# Patient Record
Sex: FEMALE | Race: BLACK OR AFRICAN AMERICAN | Employment: FULL TIME | URBAN - METROPOLITAN AREA
[De-identification: names, ages, dates, MRNs, and addresses within clinical notes are randomized per-mention and may not be internally consistent; named-entity substitution may affect disease eponyms.]

---

## 2017-01-24 DIAGNOSIS — I10 ESSENTIAL HYPERTENSION WITH GOAL BLOOD PRESSURE LESS THAN 140/90: ICD-10-CM

## 2017-01-24 RX ORDER — LISINOPRIL AND HYDROCHLOROTHIAZIDE 20; 25 MG/1; MG/1
1 TABLET ORAL DAILY
Qty: 30 TAB | Refills: 5 | Status: SHIPPED | OUTPATIENT
Start: 2017-01-24 | End: 2017-02-22 | Stop reason: SDUPTHER

## 2017-01-24 RX ORDER — FUROSEMIDE 40 MG/1
40 TABLET ORAL DAILY
Qty: 30 TAB | Refills: 5 | Status: SHIPPED | OUTPATIENT
Start: 2017-01-24 | End: 2017-02-22 | Stop reason: SDUPTHER

## 2017-01-24 NOTE — TELEPHONE ENCOUNTER
Pt called in requesting refill of her   Requested Prescriptions     Pending Prescriptions Disp Refills    lisinopril-hydroCHLOROthiazide (PRINZIDE, ZESTORETIC) 20-25 mg per tablet 30 Tab 5     Sig: Take 1 Tab by mouth daily.  furosemide (LASIX) 40 mg tablet 30 Tab 5     Sig: Take 1 Tab by mouth daily. .   Please call patient when medication is sent to the pharmacy.

## 2017-02-22 DIAGNOSIS — I10 ESSENTIAL HYPERTENSION WITH GOAL BLOOD PRESSURE LESS THAN 140/90: ICD-10-CM

## 2017-02-22 RX ORDER — FUROSEMIDE 40 MG/1
40 TABLET ORAL DAILY
Qty: 30 TAB | Refills: 5 | Status: SHIPPED | OUTPATIENT
Start: 2017-02-22 | End: 2017-06-26 | Stop reason: SDUPTHER

## 2017-02-22 RX ORDER — LISINOPRIL AND HYDROCHLOROTHIAZIDE 20; 25 MG/1; MG/1
1 TABLET ORAL DAILY
Qty: 30 TAB | Refills: 5 | Status: SHIPPED | OUTPATIENT
Start: 2017-02-22 | End: 2017-10-23 | Stop reason: SDUPTHER

## 2017-03-18 LAB
A-G RATIO,AGRAT: 1.3 RATIO (ref 1.1–2.6)
ALBUMIN SERPL-MCNC: 4.6 G/DL (ref 3.5–5)
ALP SERPL-CCNC: 84 U/L (ref 25–115)
ALT SERPL-CCNC: 20 U/L (ref 5–40)
ANION GAP SERPL CALC-SCNC: 20 MMOL/L
AST SERPL W P-5'-P-CCNC: 24 U/L (ref 10–37)
AVG GLU, 10930: 137 MG/DL (ref 91–123)
BILIRUB SERPL-MCNC: 0.6 MG/DL (ref 0.2–1.2)
BUN SERPL-MCNC: 11 MG/DL (ref 6–22)
CALCIUM SERPL-MCNC: 9.8 MG/DL (ref 8.4–10.5)
CHLORIDE SERPL-SCNC: 94 MMOL/L (ref 98–110)
CHOLEST SERPL-MCNC: 268 MG/DL (ref 110–200)
CO2 SERPL-SCNC: 28 MMOL/L (ref 20–32)
CREAT SERPL-MCNC: 0.7 MG/DL (ref 0.5–1.2)
GFRAA, 66117: >60
GFRNA, 66118: >60
GLOBULIN,GLOB: 3.5 G/DL (ref 2–4)
GLUCOSE SERPL-MCNC: 92 MG/DL (ref 65–99)
HBA1C MFR BLD HPLC: 6.4 % (ref 4.8–5.9)
HDLC SERPL-MCNC: 46 MG/DL (ref 40–59)
LDLC SERPL CALC-MCNC: 199 MG/DL (ref 50–99)
POTASSIUM SERPL-SCNC: 3.8 MMOL/L (ref 3.5–5.5)
PROT SERPL-MCNC: 8.1 G/DL (ref 6.4–8.3)
SODIUM SERPL-SCNC: 142 MMOL/L (ref 133–145)
TRIGL SERPL-MCNC: 117 MG/DL (ref 40–149)
VLDLC SERPL CALC-MCNC: 23 MG/DL (ref 8–30)

## 2017-03-24 DIAGNOSIS — I10 ESSENTIAL HYPERTENSION WITH GOAL BLOOD PRESSURE LESS THAN 140/90: ICD-10-CM

## 2017-03-24 DIAGNOSIS — E78.00 HIGH CHOLESTEROL: ICD-10-CM

## 2017-04-03 ENCOUNTER — TELEPHONE (OUTPATIENT)
Dept: INTERNAL MEDICINE CLINIC | Age: 47
End: 2017-04-03

## 2017-04-03 NOTE — TELEPHONE ENCOUNTER
Pt requesting Dr Yari Neil advice    She is applying for disability and said the disability people are asking her to do a physical with their doctor. She does not understand why this is necessary. She said \"I have a primary doctor, Dr Karen Rogers". \"can they make me do that? \"     She was upset and a little hard to understand

## 2017-04-03 NOTE — TELEPHONE ENCOUNTER
Left detailed message for patient Disability always gets \"their\" doctor to do a physical, they will request records from our office. Any more questions call the office.

## 2017-04-07 ENCOUNTER — OFFICE VISIT (OUTPATIENT)
Dept: INTERNAL MEDICINE CLINIC | Age: 47
End: 2017-04-07

## 2017-04-07 VITALS
WEIGHT: 293 LBS | HEART RATE: 85 BPM | SYSTOLIC BLOOD PRESSURE: 130 MMHG | DIASTOLIC BLOOD PRESSURE: 61 MMHG | TEMPERATURE: 97.9 F | RESPIRATION RATE: 18 BRPM | BODY MASS INDEX: 43.4 KG/M2 | HEIGHT: 69 IN | OXYGEN SATURATION: 98 %

## 2017-04-07 DIAGNOSIS — E66.01 MORBID OBESITY DUE TO EXCESS CALORIES (HCC): ICD-10-CM

## 2017-04-07 DIAGNOSIS — E78.00 HIGH CHOLESTEROL: ICD-10-CM

## 2017-04-07 DIAGNOSIS — I10 ESSENTIAL HYPERTENSION: ICD-10-CM

## 2017-04-07 NOTE — PROGRESS NOTES
Patient is in the office today for a 3 month follow up. Patient states she is having some leg pain 7/10.      1. Have you been to the ER, urgent care clinic since your last visit? Hospitalized since your last visit? No    2. Have you seen or consulted any other health care providers outside of the [address] [place] [address] since your last visit? Include any pap smears or colon screening.  No

## 2017-04-07 NOTE — MR AVS SNAPSHOT
Visit Information Date & Time Provider Department Dept. Phone Encounter #  
 4/7/2017  4:00 PM Wilma Marr MD [facility] [phone] Follow-up Instructions Return in about 4 months (around 8/7/2017) for labs 1 week before. Upcoming Health Maintenance Date Due  
 FOOT EXAM Q1 3/18/1980 EYE EXAM RETINAL OR DILATED Q1 3/18/1980 DTaP/Tdap/Td series (1 - Tdap) 3/18/1991 PAP AKA CERVICAL CYTOLOGY 3/18/1991 INFLUENZA AGE 9 TO ADULT 8/1/2016 Pneumococcal 19-64 Medium Risk (1 of 1 - PPSV23) 4/10/2017* MICROALBUMIN Q1 9/16/2017 HEMOGLOBIN A1C Q6M 9/17/2017 LIPID PANEL Q1 3/17/2018 *Topic was postponed. The date shown is not the original due date. Allergies as of 4/7/2017  Review Complete On: 4/7/2017 By: Wilma Marr MD  
  
 Severity Noted Reaction Type Reactions Amoxicillin  05/20/2013    Hives Pravastatin  11/11/2016    Myalgia, Other (comments) Syncope Zocor [Simvastatin]  06/06/2016    Other (comments) Syncope Current Immunizations  Never Reviewed No immunizations on file. Not reviewed this visit You Were Diagnosed With   
  
 Codes Comments Essential hypertension    -  Primary ICD-10-CM: I10 
ICD-9-CM: 401.9 Uncontrolled type 2 diabetes mellitus without complication, without long-term current use of insulin ([facility]     ICD-10-CM: E11.65 ICD-9-CM: 250.02 High cholesterol     ICD-10-CM: E78.00 ICD-9-CM: 272.0 Vitals BP Pulse Temp Resp Height(growth percentile) Weight(growth percentile) 130/61 (BP 1 Location: Left arm, BP Patient Position: Sitting) 85 97.9 °F (36.6 °C) (Oral) 18 5' 9\" (1.753 m) (!) 445 lb (201.9 kg) SpO2 BMI Smoking Status 98% 65.72 kg/m2 Never Smoker Vitals History BMI and BSA Data Body Mass Index Body Surface Area 65.72 kg/m 2 3.14 m 2 Preferred Pharmacy Pharmacy Name Phone [facility]. [place], [address] [facility]. [phone] Your Updated Medication List  
  
   
This list is accurate as of: 4/7/17  4:48 PM.  Always use your most recent med list.  
  
  
  
  
 COMBIGAN 0.2-0.5 % Drop ophthalmic solution Generic drug:  brimonidine-timolol  
  
 furosemide 40 mg tablet Commonly known as:  LASIX Take 1 Tab by mouth daily. ibuprofen 800 mg tablet Commonly known as:  MOTRIN  
take 1 tablet by mouth every 8 hours if needed for pain  
  
 lisinopril-hydroCHLOROthiazide 20-25 mg per tablet Commonly known as:  Rosemary Rower Take 1 Tab by mouth daily. prednisoLONE acetate 1 % ophthalmic suspension Commonly known as:  PRED FORTE Follow-up Instructions Return in about 4 months (around 8/7/2017) for labs 1 week before. To-Do List   
 04/14/2017 10:30 AM  
  Appointment with [facility] SERENE Orellana 9 RM 1 at [facility] ([phone]) PAYMENT  For Non-Medicare patients - $15.00 will be collected from you at the time of your exam.  You will be billed $35.00 from the reading [facility]. OUTSIDE FILMS  - Any outside films related to the study being scheduled should be brought with you on the day of the exam.  If this cannot be done there may be a delay in the reading of the study. MEDICATIONS  - Patient must bring a complete list of all medications currently taking to include prescriptions, over-the-counter meds, herbals, vitamins & any dietary supplements  GENERAL INSTRUCTIONS  - On the day of your exam do not use any bath powder, deodorant or lotions on the armpit area. -Tenderness of breasts may cause an increase of discomfort during procedure. If you are experiencing breast tenderness on the day of your appointment and would like to reschedule, please call [phone]. Patient Instructions Learning About Diabetes Food Guidelines Your Care Instructions Meal planning is important to manage diabetes. It helps keep your blood sugar at a target level (which you set with your doctor). You don't have to eat special foods. You can eat what your family eats, including sweets once in a while. But you do have to pay attention to how often you eat and how much you eat of certain foods. You may want to work with a dietitian or a certified diabetes educator (CDE) to help you plan meals and snacks. A dietitian or CDE can also help you lose weight if that is one of your goals. What should you know about eating carbs? Managing the amount of carbohydrate (carbs) you eat is an important part of healthy meals when you have diabetes. Carbohydrate is found in many foods. · Learn which foods have carbs. And learn the amounts of carbs in different foods. ¨ Bread, cereal, pasta, and rice have about 15 grams of carbs in a serving. A serving is 1 slice of bread (1 ounce), ½ cup of cooked cereal, or 1/3 cup of cooked pasta or rice. ¨ Fruits have 15 grams of carbs in a serving. A serving is 1 small fresh fruit, such as an apple or orange; ½ of a banana; ½ cup of cooked or canned fruit; ½ cup of fruit juice; 1 cup of melon or raspberries; or 2 tablespoons of dried fruit. ¨ Milk and no-sugar-added yogurt have 15 grams of carbs in a serving. A serving is 1 cup of milk or 2/3 cup of no-sugar-added yogurt. ¨ Starchy vegetables have 15 grams of carbs in a serving. A serving is ½ cup of mashed potatoes or sweet potato; 1 cup winter squash; ½ of a small baked potato; ½ cup of cooked beans; or ½ cup cooked corn or green peas. · Learn how much carbs to eat each day and at each meal. A dietitian or CDE can teach you how to keep track of the amount of carbs you eat. This is called carbohydrate counting. · If you are not sure how to count carbohydrate grams, use the Plate Method to plan meals.  It is a good, quick way to make sure that you have a balanced meal. It also helps you spread carbs throughout the day. ¨ Divide your plate by types of foods. Put non-starchy vegetables on half the plate, meat or other protein food on one-quarter of the plate, and a grain or starchy vegetable in the final quarter of the plate. To this you can add a small piece of fruit and 1 cup of milk or yogurt, depending on how many carbs you are supposed to eat at a meal. 
· Try to eat about the same amount of carbs at each meal. Do not \"save up\" your daily allowance of carbs to eat at one meal. 
· Proteins have very little or no carbs per serving. Examples of proteins are beef, chicken, turkey, fish, eggs, tofu, cheese, cottage cheese, and peanut butter. A serving size of meat is 3 ounces, which is about the size of a deck of cards. Examples of meat substitute serving sizes (equal to 1 ounce of meat) are 1/4 cup of cottage cheese, 1 egg, 1 tablespoon of peanut butter, and ½ cup of tofu. How can you eat out and still eat healthy? · Learn to estimate the serving sizes of foods that have carbohydrate. If you measure food at home, it will be easier to estimate the amount in a serving of restaurant food. · If the meal you order has too much carbohydrate (such as potatoes, corn, or baked beans), ask to have a low-carbohydrate food instead. Ask for a salad or green vegetables. · If you use insulin, check your blood sugar before and after eating out to help you plan how much to eat in the future. · If you eat more carbohydrate at a meal than you had planned, take a walk or do other exercise. This will help lower your blood sugar. What else should you know? · Limit saturated fat, such as the fat from meat and dairy products. This is a healthy choice because people who have diabetes are at higher risk of heart disease. So choose lean cuts of meat and nonfat or low-fat dairy products. Use olive or canola oil instead of butter or shortening when cooking. · Don't skip meals. Your blood sugar may drop too low if you skip meals and take insulin or certain medicines for diabetes. · Check with your doctor before you drink alcohol. Alcohol can cause your blood sugar to drop too low. Alcohol can also cause a bad reaction if you take certain diabetes medicines. Follow-up care is a key part of your treatment and safety. Be sure to make and go to all appointments, and call your doctor if you are having problems. It's also a good idea to know your test results and keep a list of the medicines you take. Where can you learn more? Go to http://nathanael-karon.info/. Enter S220 in the search box to learn more about \"Learning About Diabetes Food Guidelines. \" Current as of: May 23, 2016 Content Version: 11.2 © 9449-1508 [employer]igo. Care instructions adapted under license by [employer] (which disclaims liability or warranty for this information). If you have questions about a medical condition or this instruction, always ask your healthcare professional. [place] [address] any warranty or liability for your use of this information. Introducing [address]! Wendi Silver introduces [employer] patient portal. Now you can access parts of your medical record, email your doctor's office, and request medication refills online. 1. In your internet browser, go to [email or web address] 2. Click on the First Time User? Click Here link in the Sign In box. You will see the New Member Sign Up page. 3. Enter your [employer] Access Code exactly as it appears below. You will not need to use this code after youve completed the sign-up process. If you do not sign up before the expiration date, you must request a new code. · [employer] Access Code: [email or web address] Expires: 7/6/2017  4:35 PM 
 
4.  Enter the last four digits of your Social Security Number (xxxx) and Date of Birth (mm/dd/yyyy) as indicated and click Submit. You will be taken to the next sign-up page. 5. Create a RIDERS ID. This will be your RIDERS login ID and cannot be changed, so think of one that is secure and easy to remember. 6. Create a RIDERS password. You can change your password at any time. 7. Enter your Password Reset Question and Answer. This can be used at a later time if you forget your password. 8. Enter your e-mail address. You will receive e-mail notification when new information is available in [address] 9. Click Sign Up. You can now view and download portions of your medical record. 10. Click the Download Summary menu link to download a portable copy of your medical information. If you have questions, please visit the Frequently Asked Questions section of the RIDERS website. Remember, RIDERS is NOT to be used for urgent needs. For medical emergencies, dial 911. Now available from your iPhone and Android! Please provide this summary of care documentation to your next provider. Your primary care clinician is listed as LEO BROWN. If you have any questions after today's visit, please call [phone].

## 2017-04-07 NOTE — PROGRESS NOTES
Subjective:       Chief Complaint  The patient presents for follow up of diabetes, hypertension and high cholesterol. ANGY Arredondo is a 52 y.o. female seen for follow up of diabetes. Shealso has hypertension and hyperlipidemia. Diabetes well controlled, with diet, pt continues to struggle to lose weight, she is a good candidate for medical weight loss program but cannot do it due to financial reasons currently, hypertension well controlled, no significant medication side effects noted, on Zestoretic, hyperlipidemia poorly controlled, pt had syncope on multiple statins including Zocor and Pravachol. Would not try more statins with this pt. Will see if pt qaulifies for Repatha with  pt most likely has heydi hypercholesterolemia     Diet and Lifestyle: not attempting to follow a low fat, low cholesterol diet, does not rigorously follow a diabetic diet, sedentary    Home BP Monitoring: is not measured at home. Diabetic Review of Systems - home glucose monitoring: is not performed. Other symptoms and concerns: pt has significant medical problems due to her morbid obesity including severe edema and lymphedema in her lower extremities that cuases pain and difficulty with pt walking and standing for more than 10 minutes. Pt also has h/o of sarcoidosis that contributes to shortness of breath along with her morbid obesity. Pt therefore gets very short of breath with walking even 1 block. For the above reasons pt is disable until she can lose over 200 lbs. Discussed the patient's BMI with her. The BMI follow up plan is as follows: BMI is out of normal parameters and plan is as follows: I have counseled this patient on diet and exercise regimens. Current Outpatient Prescriptions   Medication Sig    lisinopril-hydroCHLOROthiazide (PRINZIDE, ZESTORETIC) 20-25 mg per tablet Take 1 Tab by mouth daily.  furosemide (LASIX) 40 mg tablet Take 1 Tab by mouth daily.     COMBIGAN 0.2-0.5 % drop ophthalmic solution     prednisoLONE acetate (PRED FORTE) 1 % ophthalmic suspension     ibuprofen (MOTRIN) 800 mg tablet take 1 tablet by mouth every 8 hours if needed for pain     No current facility-administered medications for this visit. Review of Systems  Respiratory: negative for dyspnea on exertion  Cardiovascular: negative for chest pain    Objective:     Visit Vitals    /61 (BP 1 Location: Left arm, BP Patient Position: Sitting)    Pulse 85    Temp 97.9 °F (36.6 °C) (Oral)    Resp 18    Ht 5' 9\" (1.753 m)    Wt (!) 445 lb (201.9 kg)    SpO2 98%    BMI 65.72 kg/m2        Chest - clear to auscultation, no wheezes, rales or rhonchi, symmetric air entry  Heart - normal rate, regular rhythm, normal S1, S2, no murmurs, rubs, clicks or gallops  Extremities - pedal edema 3 + bilaterally       Labs:   Lab Results   Component Value Date/Time    Hemoglobin A1c 6.4 03/17/2017 12:00 PM    Hemoglobin A1c 6.6 12/19/2016 08:15 AM    Hemoglobin A1c 6.4 09/16/2016 08:34 AM    Glucose 92 03/17/2017 12:00 PM    Microalb/Creat ratio (ug/mg creat.)  09/16/2016 08:34 AM      Comment:      ** Unable to calculate Microablumin/Creatinine Ratio due to low Microalbumin    LDL, calculated 199 03/17/2017 12:00 PM    Creatinine 0.7 03/17/2017 12:00 PM      Lab Results   Component Value Date/Time    Cholesterol, total 268 03/17/2017 12:00 PM    HDL Cholesterol 46 03/17/2017 12:00 PM    LDL, calculated 199 03/17/2017 12:00 PM    Triglyceride 117 03/17/2017 12:00 PM       Lab Results   Component Value Date/Time    ALT (SGPT) 20 03/17/2017 12:00 PM    AST (SGOT) 24 03/17/2017 12:00 PM    Alk.  phosphatase 84 03/17/2017 12:00 PM    Bilirubin, total 0.6 03/17/2017 12:00 PM       Lab Results   Component Value Date/Time    GFR est AA >60 11/04/2016 11:30 AM    GFR est non-AA >60 11/04/2016 11:30 AM    Creatinine 0.7 03/17/2017 12:00 PM    BUN 11 03/17/2017 12:00 PM    Sodium 142 03/17/2017 12:00 PM    Potassium 3.8 03/17/2017 12:00 PM    Chloride 94 03/17/2017 12:00 PM    CO2 28 03/17/2017 12:00 PM            Assessment / Plan     Diabetes borderline controlled with diet, will try and improve with weight loss  Hypertension well controlled, on Zestoretic   Hyperlipidemia poorly controlled, will try to get pt approved for Repatha. ICD-10-CM ICD-9-CM    1. Uncontrolled type 2 diabetes mellitus without complication, without long-term current use of insulin ([facility]) E11.65 250.02 HEMOGLOBIN A1C W/O EAG   2. Essential hypertension C99 168.1 METABOLIC PANEL, COMPREHENSIVE   3. High cholesterol E78.00 272.0 LIPID PANEL   4. Morbid obesity due to excess calories ([facility]) E66.01 278.01 Pt will continue to try can cut back clories until she can get into a medical weight oss program               Diabetic issues reviewed with her: diabetic diet discussed in detail, and low cholesterol diet, weight control and daily exercise discussed. Follow-up Disposition:  Return in about 4 months (around 8/7/2017) for labs 1 week before. Reviewed plan of care. Patient has provided input and agrees with goals.

## 2017-04-07 NOTE — PATIENT INSTRUCTIONS

## 2017-04-11 ENCOUNTER — TELEPHONE (OUTPATIENT)
Dept: INTERNAL MEDICINE CLINIC | Age: 47
End: 2017-04-11

## 2017-04-11 NOTE — TELEPHONE ENCOUNTER
Patient called in and stated that she need a copy of the letter in her chart that explains her medical condition. Patient stated that Dr. Eloy Harvey know what this is. Please call patient when she can pick this up.

## 2017-06-05 ENCOUNTER — TELEPHONE (OUTPATIENT)
Dept: INTERNAL MEDICINE CLINIC | Age: 47
End: 2017-06-05

## 2017-06-05 NOTE — LETTER
6/6/2017 10:18 AM 
 
Ms. Amilcar Lezama [address] [facility] [address] To Whom It May Concern; 
 
 
Please be advised it will take 7 - 14 business days to have forms completed. Any questions call the office at [phone].  
 
 
 
 
Laurence De La O MD

## 2017-06-05 NOTE — TELEPHONE ENCOUNTER
Pt dropped off SSA form to be completed and given back to patient. Pt states she need this back by the end of the week. Please call and advise pt once form is complete. Form placed at nurses station.

## 2017-06-05 NOTE — TELEPHONE ENCOUNTER
Form needs to be signed by patient. Form is a disclosure allowing the office to send medical records for a reconsideration on disability. Form given back to front office to call patient to have patient sign sheet.

## 2017-06-06 NOTE — TELEPHONE ENCOUNTER
Pt wanted to know if a letter could be written stating that the turn over time for the medical records that her paperwork is requesting will take up to 7-14 business days. Pt will need this letter today to give to [place] [employer] [place].

## 2017-06-26 RX ORDER — FUROSEMIDE 40 MG/1
40 TABLET ORAL DAILY
Qty: 30 TAB | Refills: 5 | Status: SHIPPED | OUTPATIENT
Start: 2017-06-26 | End: 2017-10-23 | Stop reason: SDUPTHER

## 2017-10-23 ENCOUNTER — OFFICE VISIT (OUTPATIENT)
Dept: FAMILY MEDICINE CLINIC | Age: 47
End: 2017-10-23

## 2017-10-23 VITALS
SYSTOLIC BLOOD PRESSURE: 149 MMHG | HEIGHT: 69 IN | BODY MASS INDEX: 43.4 KG/M2 | OXYGEN SATURATION: 95 % | RESPIRATION RATE: 20 BRPM | HEART RATE: 88 BPM | TEMPERATURE: 98 F | DIASTOLIC BLOOD PRESSURE: 80 MMHG | WEIGHT: 293 LBS

## 2017-10-23 DIAGNOSIS — M62.838 MUSCLE SPASMS OF BOTH LOWER EXTREMITIES: ICD-10-CM

## 2017-10-23 DIAGNOSIS — E66.01 MORBID OBESITY DUE TO EXCESS CALORIES (HCC): ICD-10-CM

## 2017-10-23 DIAGNOSIS — E78.00 HIGH CHOLESTEROL: ICD-10-CM

## 2017-10-23 DIAGNOSIS — G89.29 CHRONIC PAIN OF BOTH KNEES: ICD-10-CM

## 2017-10-23 DIAGNOSIS — M25.561 CHRONIC PAIN OF BOTH KNEES: ICD-10-CM

## 2017-10-23 DIAGNOSIS — E55.9 VITAMIN D DEFICIENCY: ICD-10-CM

## 2017-10-23 DIAGNOSIS — M25.562 CHRONIC PAIN OF BOTH KNEES: ICD-10-CM

## 2017-10-23 DIAGNOSIS — I10 ESSENTIAL HYPERTENSION WITH GOAL BLOOD PRESSURE LESS THAN 140/90: Primary | ICD-10-CM

## 2017-10-23 LAB
CREATININE, URINE: 120 MG/DL
MICROALB/CREAT RATIO, 140286: NORMAL MCG/MG OF CREATININE (ref 0–30)
MICROALBUMIN,URINE RANDOM 140054: <12 UG/ML (ref 0.1–17)

## 2017-10-23 RX ORDER — POTASSIUM CHLORIDE 20 MEQ/1
20 TABLET, EXTENDED RELEASE ORAL DAILY
Qty: 30 TAB | Refills: 5 | Status: ON HOLD | OUTPATIENT
Start: 2017-10-23 | End: 2017-10-28

## 2017-10-23 RX ORDER — FUROSEMIDE 40 MG/1
40 TABLET ORAL DAILY
Qty: 30 TAB | Refills: 5 | Status: SHIPPED | OUTPATIENT
Start: 2017-10-23 | End: 2018-02-19 | Stop reason: SDUPTHER

## 2017-10-23 RX ORDER — IBUPROFEN 800 MG/1
TABLET ORAL
Qty: 90 TAB | Refills: 2 | Status: SHIPPED | OUTPATIENT
Start: 2017-10-23 | End: 2017-10-28

## 2017-10-23 RX ORDER — LISINOPRIL AND HYDROCHLOROTHIAZIDE 20; 25 MG/1; MG/1
1 TABLET ORAL DAILY
Qty: 30 TAB | Refills: 5 | Status: SHIPPED | OUTPATIENT
Start: 2017-10-23 | End: 2018-02-19 | Stop reason: SDUPTHER

## 2017-10-23 NOTE — PROGRESS NOTES
Subjective:       Chief Complaint  The patient presents for follow up of diabetes, hypertension and high cholesterol. ANGY Mustafa is a 52 y.o. female seen for follow up of diabetes. Shealso has hypertension and hyperlipidemia. Diabetes well controlled, with diet, pt continues to struggle to lose weight, she is a good candidate for medical weight loss program but cannot do it due to financial reasons currently, hypertension well controlled, no significant medication side effects noted, on Zestoretic, hyperlipidemia poorly controlled, pt had syncope on multiple statins including Zocor and Pravachol. Would not try more statins with this pt. Pt has LDL~ 190 but did not qualify for Repatha    Diet and Lifestyle: not attempting to follow a low fat, low cholesterol diet, does not rigorously follow a diabetic diet, sedentary    Home BP Monitoring: is not measured at home. Diabetic Review of Systems - home glucose monitoring: is not performed. Other symptoms and concerns: pt has significant medical problems due to her morbid obesity including severe edema and lymphedema in her lower extremities that cuases pain and difficulty with pt walking and standing for more than 15 minutes. Pt also has h/o of sarcoidosis that contributes to shortness of breath along with her morbid obesity. Pt therefore gets very short of breath with walking even 1 block. Pt did not qualify for Medicare disability. Discussed the patient's BMI with her. The BMI follow up plan is as follows: BMI is out of normal parameters and plan is as follows: I have counseled this patient on diet and exercise regimens. Current Outpatient Prescriptions   Medication Sig    furosemide (LASIX) 40 mg tablet Take 1 Tab by mouth daily.  lisinopril-hydroCHLOROthiazide (PRINZIDE, ZESTORETIC) 20-25 mg per tablet Take 1 Tab by mouth daily.  potassium chloride (K-DUR, KLOR-CON) 20 mEq tablet Take 1 Tab by mouth daily.     COMBIGAN 0.2-0.5 % drop ophthalmic solution     prednisoLONE acetate (PRED FORTE) 1 % ophthalmic suspension     ibuprofen (MOTRIN) 800 mg tablet take 1 tablet by mouth every 8 hours if needed for pain     No current facility-administered medications for this visit. Review of Systems  Respiratory: negative for dyspnea on exertion  Cardiovascular: negative for chest pain    Objective:     Visit Vitals    /80 (BP 1 Location: Left arm, BP Patient Position: Sitting)    Pulse 88    Temp 98 °F (36.7 °C) (Oral)    Resp 20    Ht 5' 9\" (1.753 m)    Wt (!) 445 lb (201.9 kg)  Comment: patient refused weight today.  SpO2 95%    BMI 65.72 kg/m2        Chest - clear to auscultation, no wheezes, rales or rhonchi, symmetric air entry  Heart - normal rate, regular rhythm, normal S1, S2, no murmurs, rubs, clicks or gallops  Extremities - pedal edema 3 + bilaterally       Labs:   Lab Results   Component Value Date/Time    Hemoglobin A1c 6.2 07/29/2017 12:41 AM    Hemoglobin A1c 6.4 03/17/2017 12:00 PM    Hemoglobin A1c 6.6 12/19/2016 08:15 AM    Glucose 103 07/29/2017 12:41 AM    Microalb/Creat ratio (ug/mg creat.)  09/16/2016 08:34 AM      Comment:      ** Unable to calculate Microablumin/Creatinine Ratio due to low Microalbumin    LDL, calculated 183 07/29/2017 12:41 AM    Creatinine 0.7 07/29/2017 12:41 AM      Lab Results   Component Value Date/Time    Cholesterol, total 247 07/29/2017 12:41 AM    HDL Cholesterol 46 07/29/2017 12:41 AM    LDL, calculated 183 07/29/2017 12:41 AM    Triglyceride 92 07/29/2017 12:41 AM       Lab Results   Component Value Date/Time    ALT (SGPT) 9 07/29/2017 12:41 AM    AST (SGOT) 12 07/29/2017 12:41 AM    Alk.  phosphatase 70 07/29/2017 12:41 AM    Bilirubin, total 0.4 07/29/2017 12:41 AM       Lab Results   Component Value Date/Time    GFR est AA >60 11/04/2016 11:30 AM    GFR est non-AA >60 11/04/2016 11:30 AM    Creatinine 0.7 07/29/2017 12:41 AM    BUN 13 07/29/2017 12:41 AM Sodium 137 07/29/2017 12:41 AM    Potassium 3.5 07/29/2017 12:41 AM    Chloride 92 07/29/2017 12:41 AM    CO2 27 07/29/2017 12:41 AM            Assessment / Plan     preDiabetes control uncertain. Will check Hba1c today   Hypertension well controlled, on Zestoretic   Hyperlipidemia poorly controlled, will try to improve with weight loss. ICD-10-CM ICD-9-CM    1. Essential hypertension with goal blood pressure less than 140/90 I10 401.9 lisinopril-hydroCHLOROthiazide (PRINZIDE, ZESTORETIC) 20-25 mg per tablet   2. Uncontrolled type 2 diabetes mellitus without complication, without long-term current use of insulin ([facility]) E11.65 250.02 LIPID PANEL      METABOLIC PANEL, COMPREHENSIVE      HEMOGLOBIN A1C W/O EAG      MICROALBUMIN, UR, RAND W/ MICROALBUMIN/CREA RATIO      LIPID PANEL      METABOLIC PANEL, COMPREHENSIVE      HEMOGLOBIN A1C W/O EAG      MICROALBUMIN, UR, RAND W/ MICROALBUMIN/CREA RATIO   3. High cholesterol E78.00 272.0    4. Chronic pain of both knees M25.561 719.46 REFERRAL TO ORTHOPEDICS    M25.562 338.29     G89.29     5. Muscle spasms of both lower extremities M62.838 728.85 Will treat with Kdur every day and increase to BID if needed    6. Vitamin D deficiency E55.9 268.9 Status unknown. Will check VITAMIN D, 25 HYDROXY      VITAMIN D, 25 HYDROXY   7. Morbid obesity due to excess calories ([facility]) E66.01 278.01 Pt will continue to be as active as possible and cut back calories               Diabetic issues reviewed with her: diabetic diet discussed in detail, and low cholesterol diet, weight control and daily exercise discussed. Follow-up Disposition:  Return in about 4 months (around 2/23/2018) for labs today . Reviewed plan of care. Patient has provided input and agrees with goals.

## 2017-10-23 NOTE — PROGRESS NOTES
Patient is in the office today for a 4 month follow up. Patient is requesting a prescription for Potassium to muscle cramps. Patient states she has bilateral leg pain. 1. Have you been to the ER, urgent care clinic since your last visit? Hospitalized since your last visit? No    2. Have you seen or consulted any other health care providers outside of the [address] [place] [address] since your last visit? Include any pap smears or colon screening. yes, Dr. Joanna Ryan.

## 2017-10-23 NOTE — MR AVS SNAPSHOT
Visit Information Date & Time Provider Department Dept. Phone Encounter #  
 10/23/2017 10:45 AM Tyrone Aguirre, [address] [place] [address] [place] [id number] Follow-up Instructions Return in about 4 months (around 2/23/2018) for labs today . Upcoming Health Maintenance Date Due  
 FOOT EXAM Q1 3/18/1980 EYE EXAM RETINAL OR DILATED Q1 3/18/1980 Pneumococcal 19-64 Medium Risk (1 of 1 - PPSV23) 3/18/1989 DTaP/Tdap/Td series (1 - Tdap) 3/18/1991 PAP AKA CERVICAL CYTOLOGY 3/18/1991 INFLUENZA AGE 9 TO ADULT 8/1/2017 MICROALBUMIN Q1 9/16/2017 HEMOGLOBIN A1C Q6M 1/29/2018 LIPID PANEL Q1 7/29/2018 Allergies as of 10/23/2017  Review Complete On: 10/23/2017 By: Tyrone Aguirre MD  
  
 Severity Noted Reaction Type Reactions Amoxicillin  05/20/2013    Hives Pravastatin  11/11/2016    Myalgia, Other (comments) Syncope Zocor [Simvastatin]  06/06/2016    Other (comments) Syncope Current Immunizations  Never Reviewed No immunizations on file. Not reviewed this visit You Were Diagnosed With   
  
 Codes Comments Essential hypertension with goal blood pressure less than 140/90    -  Primary ICD-10-CM: I10 
ICD-9-CM: 401.9 Chronic pain of both knees     ICD-10-CM: M25.561, M25.562, G89.29 ICD-9-CM: 719.46, 338.29 Muscle spasms of both lower extremities     ICD-10-CM: K57.530 ICD-9-CM: 728.85 Uncontrolled type 2 diabetes mellitus without complication, without long-term current use of insulin ([facility]     ICD-10-CM: E11.65 ICD-9-CM: 250.02 Vitamin D deficiency     ICD-10-CM: E55.9 ICD-9-CM: 268.9 Vitals BP Pulse Temp Resp Height(growth percentile) Weight(growth percentile) 149/80 (BP 1 Location: Left arm, BP Patient Position: Sitting) 88 98 °F (36.7 °C) (Oral) 20 5' 9\" (1.753 m) (!) 445 lb (201.9 kg) SpO2 BMI Smoking Status 95% 65.72 kg/m2 Never Smoker BMI and BSA Data Body Mass Index Body Surface Area 65.72 kg/m 2 3.14 m 2 Preferred Pharmacy Pharmacy Name Phone [id number] [address] [phone] Your Updated Medication List  
  
   
This list is accurate as of: 10/23/17 11:27 AM.  Always use your most recent med list.  
  
  
  
  
 COMBIGAN 0.2-0.5 % Drop ophthalmic solution Generic drug:  brimonidine-timolol  
  
 furosemide 40 mg tablet Commonly known as:  LASIX Take 1 Tab by mouth daily. ibuprofen 800 mg tablet Commonly known as:  MOTRIN  
take 1 tablet by mouth every 8 hours if needed for pain  
  
 lisinopril-hydroCHLOROthiazide 20-25 mg per tablet Commonly known as:  Kathalene Rockers Take 1 Tab by mouth daily. potassium chloride 20 mEq tablet Commonly known as:  K-DUR, KLOR-CON Take 1 Tab by mouth daily. prednisoLONE acetate 1 % ophthalmic suspension Commonly known as:  PRED FORTE Prescriptions Sent to Pharmacy Refills  
 furosemide (LASIX) 40 mg tablet 5 Sig: Take 1 Tab by mouth daily. Class: Normal  
 Pharmacy: [facility] Ph #: [phone] Route: Oral  
 lisinopril-hydroCHLOROthiazide (PRINZIDE, ZESTORETIC) 20-25 mg per tablet 5 Sig: Take 1 Tab by mouth daily. Class: Normal  
 Pharmacy: [facility] Ph #: [phone] Route: Oral  
 potassium chloride (K-DUR, KLOR-CON) 20 mEq tablet 5 Sig: Take 1 Tab by mouth daily. Class: Normal  
 [facility] [address] Ph #: [phone] Route: Oral  
  
We Performed the Following REFERRAL TO ORTHOPEDICS [MZH964 Custom] Comments:  
 Refer for bilateral knee pain Follow-up Instructions Return in about 4 months (around 2/23/2018) for labs today . To-Do List   
 11/10/2017 10:30 AM  
 Appointment with [facility] MIGEL Jackson 9 RM 1 at [facility] ([phone]) PAYMENT  For Non-Medicare patients - $15.00 will be collected from you at the time of your exam.  You will be billed $35.00 from the reading [facility]. OUTSIDE FILMS  - Any outside films related to the study being scheduled should be brought with you on the day of the exam.  If this cannot be done there may be a delay in the reading of the study. MEDICATIONS  - Patient must bring a complete list of all medications currently taking to include prescriptions, over-the-counter meds, herbals, vitamins & any dietary supplements  GENERAL INSTRUCTIONS  - On the day of your exam do not use any bath powder, deodorant or lotions on the armpit area. -Tenderness of breasts may cause an increase of discomfort during procedure. If you are experiencing breast tenderness on the day of your appointment and would like to reschedule, please call [phone].   
  
 04/22/2018 Lab:  LIPID PANEL   
  
 04/22/2018 Lab:  METABOLIC PANEL, COMPREHENSIVE   
  
 04/24/2018 Lab:  HEMOGLOBIN A1C W/O EAG   
  
 04/24/2018 Lab:  VITAMIN D, 25 HYDROXY Referral Information Referral ID Referred By Referred To  
  
 [id number] LEO BROWN MD   
   [facility] [address] [place]az [place], [address] Phone: [phone] Fax: [phone] Visits Status Start Date End Date 1 New Request 10/23/17 10/23/18 If your referral has a status of pending review or denied, additional information will be sent to support the outcome of this decision. Introducing [facility] & HEALTH SERVICES! Lisha Govea introduces [employer] patient portal. Now you can access parts of your medical record, email your doctor's office, and request medication refills online. 1. In your internet browser, go to [email or web address] 2. Click on the First Time User? Click Here link in the Sign In box. You will see the New Member Sign Up page. 3. Enter your [employer] Access Code exactly as it appears below. You will not need to use this code after youve completed the sign-up process. If you do not sign up before the expiration date, you must request a new code. · [employer] Access Code: [email or web address] Expires: 12/27/2017 10:43 AM 
 
4. Enter the last four digits of your Social Security Number (xxxx) and Date of Birth (mm/dd/yyyy) as indicated and click Submit. You will be taken to the next sign-up page. 5. Create a Kony ID. This will be your Kony login ID and cannot be changed, so think of one that is secure and easy to remember. 6. Create a Kony password. You can change your password at any time. 7. Enter your Password Reset Question and Answer. This can be used at a later time if you forget your password. 8. Enter your e-mail address. You will receive e-mail notification when new information is available in [address] 9. Click Sign Up. You can now view and download portions of your medical record. 10. Click the Download Summary menu link to download a portable copy of your medical information. If you have questions, please visit the Frequently Asked Questions section of the Kony website. Remember, [employer] is NOT to be used for urgent needs. For medical emergencies, dial 911. Now available from your iPhone and Android! Please provide this summary of care documentation to your next provider. Your primary care clinician is listed as LEO BROWN. If you have any questions after today's visit, please call [phone].

## 2017-10-24 LAB
25(OH)D3 SERPL-MCNC: 14 NG/ML (ref 32–100)
A-G RATIO,AGRAT: 1.3 RATIO (ref 1.1–2.6)
ALBUMIN SERPL-MCNC: 4.3 G/DL (ref 3.5–5)
ALP SERPL-CCNC: 71 U/L (ref 25–115)
ALT SERPL-CCNC: 13 U/L (ref 5–40)
ANION GAP SERPL CALC-SCNC: 23 MMOL/L
AST SERPL W P-5'-P-CCNC: 14 U/L (ref 10–37)
AVG GLU, 10930: 136 MG/DL (ref 91–123)
BILIRUB SERPL-MCNC: 0.5 MG/DL (ref 0.2–1.2)
BUN SERPL-MCNC: 18 MG/DL (ref 6–22)
CALCIUM SERPL-MCNC: 9.7 MG/DL (ref 8.4–10.5)
CHLORIDE SERPL-SCNC: 93 MMOL/L (ref 98–110)
CHOLEST SERPL-MCNC: 231 MG/DL (ref 110–200)
CO2 SERPL-SCNC: 24 MMOL/L (ref 20–32)
CREAT SERPL-MCNC: 0.8 MG/DL (ref 0.5–1.2)
GFRAA, 66117: >60
GFRNA, 66118: >60
GLOBULIN,GLOB: 3.4 G/DL (ref 2–4)
GLUCOSE SERPL-MCNC: 104 MG/DL (ref 70–99)
HBA1C MFR BLD HPLC: 6.4 % (ref 4.8–5.9)
HDLC SERPL-MCNC: 41 MG/DL (ref 40–59)
LDLC SERPL CALC-MCNC: 167 MG/DL (ref 50–99)
POTASSIUM SERPL-SCNC: 4.1 MMOL/L (ref 3.5–5.5)
PROT SERPL-MCNC: 7.7 G/DL (ref 6.4–8.3)
SODIUM SERPL-SCNC: 140 MMOL/L (ref 133–145)
TRIGL SERPL-MCNC: 116 MG/DL (ref 40–149)
VLDLC SERPL CALC-MCNC: 23 MG/DL (ref 8–30)

## 2017-10-24 NOTE — PROGRESS NOTES
Tell patient his/her cholesterol is improving but her vitamin D level is low.  She should take vitamin D 2000 units/day OTC

## 2017-10-27 ENCOUNTER — TELEPHONE (OUTPATIENT)
Dept: FAMILY MEDICINE CLINIC | Age: 47
End: 2017-10-27

## 2017-10-27 ENCOUNTER — APPOINTMENT (OUTPATIENT)
Dept: CT IMAGING | Age: 47
DRG: 101 | End: 2017-10-27
Attending: EMERGENCY MEDICINE
Payer: COMMERCIAL

## 2017-10-27 ENCOUNTER — APPOINTMENT (OUTPATIENT)
Dept: GENERAL RADIOLOGY | Age: 47
DRG: 101 | End: 2017-10-27
Attending: EMERGENCY MEDICINE
Payer: COMMERCIAL

## 2017-10-27 ENCOUNTER — HOSPITAL ENCOUNTER (INPATIENT)
Age: 47
LOS: 1 days | Discharge: HOME | DRG: 101 | End: 2017-10-28
Attending: EMERGENCY MEDICINE | Admitting: INTERNAL MEDICINE
Payer: COMMERCIAL

## 2017-10-27 DIAGNOSIS — R56.9 SEIZURE (HCC): Primary | ICD-10-CM

## 2017-10-27 LAB
ALBUMIN SERPL-MCNC: 3.8 G/DL (ref 3.4–5)
ALBUMIN/GLOB SERPL: 0.8 {RATIO} (ref 0.8–1.7)
ALP SERPL-CCNC: 82 U/L (ref 45–117)
ALT SERPL-CCNC: 23 U/L (ref 13–56)
ANION GAP SERPL CALC-SCNC: 8 MMOL/L (ref 3–18)
APPEARANCE UR: ABNORMAL
AST SERPL-CCNC: 12 U/L (ref 15–37)
BACTERIA URNS QL MICRO: ABNORMAL /HPF
BASOPHILS # BLD: 0 K/UL (ref 0–0.1)
BASOPHILS NFR BLD: 1 % (ref 0–2)
BILIRUB DIRECT SERPL-MCNC: 0.2 MG/DL (ref 0–0.2)
BILIRUB SERPL-MCNC: 0.4 MG/DL (ref 0.2–1)
BILIRUB UR QL: NEGATIVE
BUN SERPL-MCNC: 23 MG/DL (ref 7–18)
BUN/CREAT SERPL: 20 (ref 12–20)
CA-I SERPL-SCNC: 1.09 MMOL/L (ref 1.12–1.32)
CALCIUM SERPL-MCNC: 9 MG/DL (ref 8.5–10.1)
CHLORIDE SERPL-SCNC: 98 MMOL/L (ref 100–108)
CK MB CFR SERPL CALC: NORMAL % (ref 0–4)
CK MB SERPL-MCNC: <1 NG/ML (ref 5–25)
CK SERPL-CCNC: 81 U/L (ref 26–192)
CO2 SERPL-SCNC: 26 MMOL/L (ref 21–32)
COLOR UR: YELLOW
CREAT SERPL-MCNC: 1.13 MG/DL (ref 0.6–1.3)
DIFFERENTIAL METHOD BLD: ABNORMAL
EOSINOPHIL # BLD: 0.6 K/UL (ref 0–0.4)
EOSINOPHIL NFR BLD: 7 % (ref 0–5)
EPITH CASTS URNS QL MICRO: ABNORMAL /LPF (ref 0–5)
ERYTHROCYTE [DISTWIDTH] IN BLOOD BY AUTOMATED COUNT: 13.2 % (ref 11.6–14.5)
GLOBULIN SER CALC-MCNC: 4.9 G/DL (ref 2–4)
GLUCOSE SERPL-MCNC: 106 MG/DL (ref 74–99)
GLUCOSE UR STRIP.AUTO-MCNC: NEGATIVE MG/DL
HCG UR QL: NEGATIVE
HCT VFR BLD AUTO: 36.3 % (ref 35–45)
HGB BLD-MCNC: 12.1 G/DL (ref 12–16)
HGB UR QL STRIP: ABNORMAL
KETONES UR QL STRIP.AUTO: NEGATIVE MG/DL
LEUKOCYTE ESTERASE UR QL STRIP.AUTO: ABNORMAL
LYMPHOCYTES # BLD: 2.2 K/UL (ref 0.9–3.6)
LYMPHOCYTES NFR BLD: 27 % (ref 21–52)
MAGNESIUM SERPL-MCNC: 1.8 MG/DL (ref 1.6–2.6)
MCH RBC QN AUTO: 29.3 PG (ref 24–34)
MCHC RBC AUTO-ENTMCNC: 33.3 G/DL (ref 31–37)
MCV RBC AUTO: 87.9 FL (ref 74–97)
MONOCYTES # BLD: 0.6 K/UL (ref 0.05–1.2)
MONOCYTES NFR BLD: 8 % (ref 3–10)
NEUTS SEG # BLD: 4.8 K/UL (ref 1.8–8)
NEUTS SEG NFR BLD: 57 % (ref 40–73)
NITRITE UR QL STRIP.AUTO: NEGATIVE
PH UR STRIP: 5 [PH] (ref 5–8)
PLATELET # BLD AUTO: 405 K/UL (ref 135–420)
PMV BLD AUTO: 10.2 FL (ref 9.2–11.8)
POTASSIUM SERPL-SCNC: 3.3 MMOL/L (ref 3.5–5.5)
PROT SERPL-MCNC: 8.7 G/DL (ref 6.4–8.2)
PROT UR STRIP-MCNC: NEGATIVE MG/DL
RBC # BLD AUTO: 4.13 M/UL (ref 4.2–5.3)
RBC #/AREA URNS HPF: ABNORMAL /HPF (ref 0–5)
SODIUM SERPL-SCNC: 132 MMOL/L (ref 136–145)
SP GR UR REFRACTOMETRY: 1.02 (ref 1–1.03)
TROPONIN I SERPL-MCNC: <0.02 NG/ML (ref 0–0.04)
UROBILINOGEN UR QL STRIP.AUTO: 0.2 EU/DL (ref 0.2–1)
WBC # BLD AUTO: 8.3 K/UL (ref 4.6–13.2)
WBC URNS QL MICRO: ABNORMAL /HPF (ref 0–4)

## 2017-10-27 PROCEDURE — 82330 ASSAY OF CALCIUM: CPT | Performed by: EMERGENCY MEDICINE

## 2017-10-27 PROCEDURE — 83735 ASSAY OF MAGNESIUM: CPT | Performed by: EMERGENCY MEDICINE

## 2017-10-27 PROCEDURE — 99285 EMERGENCY DEPT VISIT HI MDM: CPT

## 2017-10-27 PROCEDURE — 93005 ELECTROCARDIOGRAM TRACING: CPT

## 2017-10-27 PROCEDURE — 74011250637 HC RX REV CODE- 250/637: Performed by: EMERGENCY MEDICINE

## 2017-10-27 PROCEDURE — 81001 URINALYSIS AUTO W/SCOPE: CPT | Performed by: EMERGENCY MEDICINE

## 2017-10-27 PROCEDURE — 82550 ASSAY OF CK (CPK): CPT | Performed by: EMERGENCY MEDICINE

## 2017-10-27 PROCEDURE — 70450 CT HEAD/BRAIN W/O DYE: CPT

## 2017-10-27 PROCEDURE — 80048 BASIC METABOLIC PNL TOTAL CA: CPT | Performed by: EMERGENCY MEDICINE

## 2017-10-27 PROCEDURE — 71010 XR CHEST PORT: CPT

## 2017-10-27 PROCEDURE — 85025 COMPLETE CBC W/AUTO DIFF WBC: CPT | Performed by: EMERGENCY MEDICINE

## 2017-10-27 PROCEDURE — 81025 URINE PREGNANCY TEST: CPT | Performed by: EMERGENCY MEDICINE

## 2017-10-27 PROCEDURE — 80076 HEPATIC FUNCTION PANEL: CPT | Performed by: EMERGENCY MEDICINE

## 2017-10-27 RX ORDER — POTASSIUM CHLORIDE 20 MEQ/1
40 TABLET, EXTENDED RELEASE ORAL
Status: COMPLETED | OUTPATIENT
Start: 2017-10-27 | End: 2017-10-27

## 2017-10-27 RX ADMIN — POTASSIUM CHLORIDE 40 MEQ: 20 TABLET, EXTENDED RELEASE ORAL at 23:36

## 2017-10-27 NOTE — Clinical Note
Status[de-identified] Inpatient [101] Type of Bed: Neuro/Stroke [9] Inpatient Hospitalization Certified Necessary for the Following Reasons: 9. Other (further clarification in H&P documentation) Ad[facility]: [facility] [954335] Admitting Physician: Sarita Bartlett Attending Physician: Sarita Bartlett Estimated Length of Stay: 2 Midnights Discharge Plan[de-identified] 2003 [facility]

## 2017-10-27 NOTE — IP AVS SNAPSHOT
[facility] 
 
 
 [address] [facility] [address] [facility] Patient: Espinoza Smith MRN: [id number] MHD:2[birth year]35[birth year] About your hospitalization You were admitted on:  October 28, 2017 You last received care in the:  [facility] [address] [facility]. You were discharged on:  October 28, 2017 Why you were hospitalized Your primary diagnosis was:  Not on File Your diagnoses also included:  Seizure (Hcc), Seizures (Hcc) Things You Need To Do (next 8 weeks) Follow up with Regis Peralta MD  
  
Phone:  [phone] Where:  [address] [facility] Piotr Karie, [address] [facility] [address] Follow up with Deanna Soto MD  
  
Phone:  [phone] Where:  [address], 100 [facility], 500 [facility] Friday Nov 10, 2017 [facility] 3D EVELYN MAMMO SCREENING with [facility] STEREO BX RM 1 at 10:30 AM  
PAYMENT  For Non-Medicare patients - $15.00 will be collected from you at the time of your exam.  You will be billed $35.00 from the reading [facility]. OUTSIDE FILMS  - Any outside films related to the study being scheduled should be brought with you on the day of the exam.  If this cannot be done there may be a delay in the reading of the study. MEDICATIONS  - Patient must bring a complete list of all medications currently taking to include prescriptions, over-the-counter meds, herbals, vitamins & any dietary supplements  GENERAL INSTRUCTIONS  - On the day of your exam do not use any bath powder, deodorant or lotions on the armpit area. -Tenderness of breasts may cause an increase of discomfort during procedure. If you are experiencing breast tenderness on the day of your appointment and would like to reschedule, please call [phone]. Where:  [facility]) Discharge Orders None A check blanca indicates which time of day the medication should be taken. My Medications STOP taking these medications   
 ibuprofen 800 mg tablet Commonly known as:  MOTRIN  
   
  
  
TAKE these medications as instructed Instructions Each Dose to Equal  
 Morning Noon Evening Bedtime COMBIGAN 0.2-0.5 % Drop ophthalmic solution Generic drug:  brimonidine-timolol  
   
      
  
   
   
   
  
  
 furosemide 40 mg tablet Commonly known as:  LASIX Your last dose was: This morning. Your next dose is:  Tomorrow morning. Take 1 Tab by mouth daily. 40 mg  
    
  
   
   
   
  
 levETIRAcetam 1,000 mg tablet Commonly known as:  KEPPRA Your next dose is: This evening. Take 1 Tab by mouth two (2) times a day. 1000 mg  
    
  
   
   
  
   
  
 lisinopril-hydroCHLOROthiazide 20-25 mg per tablet Commonly known as:  Shant Gusman Your last dose was: This morning. Your next dose is:  Tomorrow morning. Take 1 Tab by mouth daily. 1 Tab  
    
  
   
   
   
  
 potassium chloride 20 mEq tablet Commonly known as:  K-DUR, KLOR-CON Your last dose was: This evening with dinner. Your next dose is:  Tomorrow morning. Take 1 Tab by mouth two (2) times a day. 20 mEq  
    
  
   
   
  
   
  
 prednisoLONE acetate 1 % ophthalmic suspension Commonly known as:  PRED FORTE Your last dose was: This afternoon. Your next dose is: This evening. Afternoon VITAMIN D3 2,000 unit Tab Generic drug:  cholecalciferol (vitamin D3) Your next dose is:  Tomorrow morning. Take  by mouth. Indications: Vitamin D Deficiency Where to Get Your Medications These medications were sent to [facility]. Dickson Li, [address] Phone:  [phone]  
  levETIRAcetam 1,000 mg tablet  
 potassium chloride 20 mEq tablet Discharge Instructions Discharge Instructions Patient: Tylor Bolton MRN: [id number]  CSN: [id number] YOB: 1970  Age: 52 y.o. Sex: female DOA: 10/27/2017 LOS:  LOS: 0 days   Discharge Date: DIET:  Diabetic Diet ACTIVITY: Activity as tolerated ADDITIONAL INFORMATION: If you experience any of the following symptoms but not limited to Fever, chills, nausea, vomiting, diarrhea, change in mentation, falling, bleeding, shortness of breath, chest pain, please call your primary care physician or return to the emergency room if you cannot get hold of your doctor:  
 
FOLLOW UP CARE: 
PCP in 1 week.   
Neurology Dr. Marianne Hay in 2-3 weeks Do NOT DRIVE Joceline Edouard NP 
10/28/2017 6:00 PM 
 
 
 
 
 
 
  
  
  
[employer] Announcement We are excited to announce that we are making your provider's discharge notes available to you in [employer]. You will see these notes when they are completed and signed by the physician that discharged you from your recent hospital stay. If you have any questions or concerns about any information you see in [employer], please call the Health Information Department where you were seen or reach out to your Primary Care Provider for more information about your plan of care. Introducing [facility] & HEALTH SERVICES! Jennifer Johnson introduces [employer] patient portal. Now you can access parts of your medical record, email your doctor's office, and request medication refills online. 1. In your internet browser, go to [email or web address] 2. Click on the First Time User? Click Here link in the Sign In box. You will see the New Member Sign Up page. 3. Enter your [employer] Access Code exactly as it appears below. You will not need to use this code after youve completed the sign-up process. If you do not sign up before the expiration date, you must request a new code. · [employer] Access Code: [email or web address] Expires: 12/27/2017 10:43 AM 
 
 4. Enter the last four digits of your Social Security Number (xxxx) and Date of Birth (mm/dd/yyyy) as indicated and click Submit. You will be taken to the next sign-up page. 5. Create a [employer] ID. This will be your [employer] login ID and cannot be changed, so think of one that is secure and easy to remember. 6. Create a [employer] password. You can change your password at any time. 7. Enter your Password Reset Question and Answer. This can be used at a later time if you forget your password. 8. Enter your e-mail address. You will receive e-mail notification when new information is available in [address] 9. Click Sign Up. You can now view and download portions of your medical record. 10. Click the Download Summary menu link to download a portable copy of your medical information. If you have questions, please visit the Frequently Asked Questions section of the [employer] website. Remember, [employer] is NOT to be used for urgent needs. For medical emergencies, dial 911. Now available from your iPhone and Android! Providers Seen During Your Hospitalization Provider Specialty Primary office phone Alber Ingram MD Emergency Medicine [phone] [facility], [address] [place] [address] Internal Medicine [phone] Your Primary Care Physician (PCP) Primary Care Physician Office Phone Office Fax [address] [place] [address] [phone] You are allergic to the following Allergen Reactions Amoxicillin Hives Pravastatin Myalgia Other (comments) Syncope Zocor (Simvastatin) Other (comments) Syncope Recent Documentation Smoking Status Never Smoker Emergency Contacts Name Discharge Info Relation Home Work [place], [place] NO [2] Parent [1] [phone] [address] [place] [address] CAREGIVER [3] Sister [23] [phone] Patient Belongings The following personal items are in your possession at time of discharge: Dental Appliances: None  Visual Aid: None      Home Medications: None   Jewelry: Bracelet  Clothing: Pants, Shirt, Undergarments    Other Valuables: Cell Phone, Purse, With patient Please provide this summary of care documentation to your next provider. Signatures-by signing, you are acknowledging that this After Visit Summary has been reviewed with you and you have received a copy. Patient Signature:  ____________________________________________________________ Date:  ____________________________________________________________  
  
[facility] Provider Signature:  ____________________________________________________________ Date:  ____________________________________________________________

## 2017-10-27 NOTE — IP AVS SNAPSHOT
[address] Patient: Tal Mcnair MRN: [id number] SDB:[id number] My Medications STOP taking these medications   
 ibuprofen 800 mg tablet Commonly known as:  MOTRIN  
   
  
  
TAKE these medications as instructed Instructions Each Dose to Equal  
 Morning Noon Evening Bedtime COMBIGAN 0.2-0.5 % Drop ophthalmic solution Generic drug:  brimonidine-timolol  
   
      
  
   
   
   
  
  
 furosemide 40 mg tablet Commonly known as:  LASIX Your last dose was: This morning. Your next dose is:  Tomorrow morning. Take 1 Tab by mouth daily. 40 mg  
    
  
   
   
   
  
 levETIRAcetam 1,000 mg tablet Commonly known as:  KEPPRA Your next dose is: This evening. Take 1 Tab by mouth two (2) times a day. 1000 mg  
    
  
   
   
  
   
  
 lisinopril-hydroCHLOROthiazide 20-25 mg per tablet Commonly known as:  [place] Your last dose was: This morning. Your next dose is:  Tomorrow morning. Take 1 Tab by mouth daily. 1 Tab  
    
  
   
   
   
  
 potassium chloride 20 mEq tablet Commonly known as:  K-DUR, KLOR-CON Your last dose was: This evening with dinner. Your next dose is:  Tomorrow morning. Take 1 Tab by mouth two (2) times a day. 20 mEq  
    
  
   
   
  
   
  
 prednisoLONE acetate 1 % ophthalmic suspension Commonly known as:  PRED FORTE Your last dose was: This afternoon. Your next dose is: This evening. Afternoon VITAMIN D3 2,000 unit Tab Generic drug:  cholecalciferol (vitamin D3) Your next dose is:  Tomorrow morning. Take  by mouth. Indications: Vitamin D Deficiency Where to Get Your Medications These medications were sent to [facility].  [facility] - [address] [facility]. [address] [place] [address] Phone:  [phone]  
  levETIRAcetam 1,000 mg tablet  
 potassium chloride 20 mEq tablet

## 2017-10-28 ENCOUNTER — APPOINTMENT (OUTPATIENT)
Dept: MRI IMAGING | Age: 47
DRG: 101 | End: 2017-10-28
Attending: INTERNAL MEDICINE
Payer: COMMERCIAL

## 2017-10-28 VITALS
HEART RATE: 91 BPM | OXYGEN SATURATION: 96 % | SYSTOLIC BLOOD PRESSURE: 98 MMHG | TEMPERATURE: 98.3 F | RESPIRATION RATE: 17 BRPM | DIASTOLIC BLOOD PRESSURE: 73 MMHG

## 2017-10-28 PROBLEM — R56.9 SEIZURES (HCC): Status: ACTIVE | Noted: 2017-10-28

## 2017-10-28 PROBLEM — R56.9 SEIZURE (HCC): Status: ACTIVE | Noted: 2017-10-28

## 2017-10-28 LAB
ATRIAL RATE: 86 BPM
CALCULATED P AXIS, ECG09: 23 DEGREES
CALCULATED R AXIS, ECG10: 13 DEGREES
CALCULATED T AXIS, ECG11: 17 DEGREES
D DIMER PPP FEU-MCNC: 0.9 UG/ML(FEU)
DIAGNOSIS, 93000: NORMAL
GLUCOSE BLD STRIP.AUTO-MCNC: 119 MG/DL (ref 70–110)
P-R INTERVAL, ECG05: 250 MS
Q-T INTERVAL, ECG07: 358 MS
QRS DURATION, ECG06: 80 MS
QTC CALCULATION (BEZET), ECG08: 428 MS
VENTRICULAR RATE, ECG03: 86 BPM

## 2017-10-28 PROCEDURE — 36415 COLL VENOUS BLD VENIPUNCTURE: CPT | Performed by: INTERNAL MEDICINE

## 2017-10-28 PROCEDURE — 74011250637 HC RX REV CODE- 250/637: Performed by: INTERNAL MEDICINE

## 2017-10-28 PROCEDURE — 85379 FIBRIN DEGRADATION QUANT: CPT | Performed by: INTERNAL MEDICINE

## 2017-10-28 PROCEDURE — 65270000029 HC RM PRIVATE

## 2017-10-28 PROCEDURE — 74011000250 HC RX REV CODE- 250: Performed by: INTERNAL MEDICINE

## 2017-10-28 PROCEDURE — 93970 EXTREMITY STUDY: CPT

## 2017-10-28 PROCEDURE — 74011250636 HC RX REV CODE- 250/636: Performed by: INTERNAL MEDICINE

## 2017-10-28 PROCEDURE — 82962 GLUCOSE BLOOD TEST: CPT

## 2017-10-28 PROCEDURE — 70553 MRI BRAIN STEM W/O & W/DYE: CPT

## 2017-10-28 PROCEDURE — A9585 GADOBUTROL INJECTION: HCPCS | Performed by: INTERNAL MEDICINE

## 2017-10-28 RX ORDER — LEVOFLOXACIN 750 MG/1
750 TABLET ORAL EVERY 24 HOURS
Status: DISCONTINUED | OUTPATIENT
Start: 2017-10-28 | End: 2017-10-29 | Stop reason: HOSPADM

## 2017-10-28 RX ORDER — LEVETIRACETAM 1000 MG/1
1000 TABLET ORAL 2 TIMES DAILY
Qty: 60 TAB | Refills: 0 | Status: SHIPPED | OUTPATIENT
Start: 2017-10-28 | End: 2017-11-22 | Stop reason: SDUPTHER

## 2017-10-28 RX ORDER — LISINOPRIL AND HYDROCHLOROTHIAZIDE 12.5; 2 MG/1; MG/1
1 TABLET ORAL DAILY
Status: DISCONTINUED | OUTPATIENT
Start: 2017-10-28 | End: 2017-10-29 | Stop reason: HOSPADM

## 2017-10-28 RX ORDER — FUROSEMIDE 40 MG/1
40 TABLET ORAL DAILY
Status: DISCONTINUED | OUTPATIENT
Start: 2017-10-28 | End: 2017-10-29 | Stop reason: HOSPADM

## 2017-10-28 RX ORDER — HEPARIN SODIUM 5000 [USP'U]/ML
5000 INJECTION, SOLUTION INTRAVENOUS; SUBCUTANEOUS EVERY 8 HOURS
Status: DISCONTINUED | OUTPATIENT
Start: 2017-10-28 | End: 2017-10-29 | Stop reason: HOSPADM

## 2017-10-28 RX ORDER — ONDANSETRON 2 MG/ML
4 INJECTION INTRAMUSCULAR; INTRAVENOUS
Status: DISCONTINUED | OUTPATIENT
Start: 2017-10-28 | End: 2017-10-29 | Stop reason: HOSPADM

## 2017-10-28 RX ORDER — PREDNISOLONE ACETATE 10 MG/ML
1 SUSPENSION/ DROPS OPHTHALMIC 3 TIMES DAILY
Status: DISCONTINUED | OUTPATIENT
Start: 2017-10-28 | End: 2017-10-29 | Stop reason: HOSPADM

## 2017-10-28 RX ORDER — ACETAMINOPHEN 325 MG/1
650 TABLET ORAL
Status: DISCONTINUED | OUTPATIENT
Start: 2017-10-28 | End: 2017-10-29 | Stop reason: HOSPADM

## 2017-10-28 RX ORDER — LORAZEPAM 2 MG/ML
1 INJECTION INTRAMUSCULAR ONCE
Status: COMPLETED | OUTPATIENT
Start: 2017-10-28 | End: 2017-10-28

## 2017-10-28 RX ORDER — HYDROCODONE BITARTRATE AND ACETAMINOPHEN 7.5; 325 MG/1; MG/1
1 TABLET ORAL
Status: DISCONTINUED | OUTPATIENT
Start: 2017-10-28 | End: 2017-10-29 | Stop reason: HOSPADM

## 2017-10-28 RX ORDER — BRIMONIDINE TARTRATE, TIMOLOL MALEATE 2; 5 MG/ML; MG/ML
1 SOLUTION/ DROPS OPHTHALMIC EVERY 12 HOURS
Status: DISCONTINUED | OUTPATIENT
Start: 2017-10-28 | End: 2017-10-29 | Stop reason: HOSPADM

## 2017-10-28 RX ORDER — CHOLECALCIFEROL (VITAMIN D3) 125 MCG
CAPSULE ORAL
COMMUNITY

## 2017-10-28 RX ORDER — SODIUM CHLORIDE 9 MG/ML
75 INJECTION, SOLUTION INTRAVENOUS CONTINUOUS
Status: DISCONTINUED | OUTPATIENT
Start: 2017-10-28 | End: 2017-10-29 | Stop reason: HOSPADM

## 2017-10-28 RX ORDER — POTASSIUM CHLORIDE 20 MEQ/1
20 TABLET, EXTENDED RELEASE ORAL 2 TIMES DAILY
Qty: 60 TAB | Refills: 0 | Status: SHIPPED | OUTPATIENT
Start: 2017-10-28 | End: 2018-02-19 | Stop reason: SDUPTHER

## 2017-10-28 RX ORDER — LEVETIRACETAM 10 MG/ML
1000 INJECTION INTRAVASCULAR EVERY 12 HOURS
Status: DISCONTINUED | OUTPATIENT
Start: 2017-10-28 | End: 2017-10-29 | Stop reason: HOSPADM

## 2017-10-28 RX ADMIN — LEVETIRACETAM 1000 MG: 10 INJECTION INTRAVENOUS at 16:33

## 2017-10-28 RX ADMIN — FUROSEMIDE 40 MG: 40 TABLET ORAL at 12:07

## 2017-10-28 RX ADMIN — HEPARIN SODIUM 5000 UNITS: 5000 INJECTION, SOLUTION INTRAVENOUS; SUBCUTANEOUS at 03:59

## 2017-10-28 RX ADMIN — BRIMONIDINE TARTRATE, TIMOLOL MALEATE 1 DROP: 2; 5 SOLUTION/ DROPS OPHTHALMIC at 12:10

## 2017-10-28 RX ADMIN — PREDNISOLONE ACETATE 1 DROP: 10 SUSPENSION/ DROPS OPHTHALMIC at 16:35

## 2017-10-28 RX ADMIN — LORAZEPAM 1 MG: 2 INJECTION INTRAMUSCULAR; INTRAVENOUS at 10:24

## 2017-10-28 RX ADMIN — LEVETIRACETAM 1000 MG: 10 INJECTION INTRAVENOUS at 04:00

## 2017-10-28 RX ADMIN — HEPARIN SODIUM 5000 UNITS: 5000 INJECTION, SOLUTION INTRAVENOUS; SUBCUTANEOUS at 16:34

## 2017-10-28 RX ADMIN — POTASSIUM BICARBONATE 25 MEQ: 25 TABLET, EFFERVESCENT ORAL at 12:07

## 2017-10-28 RX ADMIN — HYDROCODONE BITARTRATE AND ACETAMINOPHEN 1 TABLET: 7.5; 325 TABLET ORAL at 03:59

## 2017-10-28 RX ADMIN — POTASSIUM BICARBONATE 25 MEQ: 25 TABLET, EFFERVESCENT ORAL at 16:34

## 2017-10-28 RX ADMIN — GADOBUTROL 15 ML: 604.72 INJECTION INTRAVENOUS at 11:00

## 2017-10-28 RX ADMIN — LISINOPRIL AND HYDROCHLOROTHIAZIDE 1 TABLET: 12.5; 2 TABLET ORAL at 12:07

## 2017-10-28 RX ADMIN — PREDNISOLONE ACETATE 1 DROP: 10 SUSPENSION/ DROPS OPHTHALMIC at 12:10

## 2017-10-28 RX ADMIN — LEVOFLOXACIN 750 MG: 750 TABLET, FILM COATED ORAL at 03:58

## 2017-10-28 RX ADMIN — SODIUM CHLORIDE 75 ML/HR: 9 INJECTION, SOLUTION INTRAVENOUS at 04:03

## 2017-10-28 NOTE — ED PROVIDER NOTES
HPI Comments: Brooke Milian is a 52 y.o. female with a history of HTN, borderline diabetes (resolved), HLD, sarcoidosis (2005, lungs, arms/legs), glaucoma, and lymphedema, who presents to the ED via EMS with complaint of seizure vs. syncopal episode this evening. Patient repors that she remembers sitting at the kitchen table at work and suddenly she woke up on the floor. She does not remember falling and cannot state how long she was on the floor. Patient denies associated biting her tongue or urinary incontinence. She report previous experience of syncope and has been seen at [facility] for similar complaints. Patient states that she has had cramps in her bilateral thighs and was recently started on potassium within the past week. She spoke with her PCP today and was told that her cholesterol level was good, but her Vitamin D level was \"very low. \" Patient denies history of seizures. She states that she has been under increased stress recently as her mother passed away one year ago. Patient is a  during the day and works as a  for disabled individuals during the evenings. She denies feeling poorly prior to her incident this evening. Patient makes no further complaint of headache, weakness, numbness, nausea, or diaphoresis. Denies smoking, alcohol, and drug use. The history is provided by the patient.         Past Medical History:   Diagnosis Date    Borderline diabetes     Diabetes ([facility] [address]     Glaucoma 5/9/2016    Left eye    Hypertension     Lymph edema     Other ill-defined conditions(799.89)     high cholesterol    Sarcoidosis        Past Surgical History:   Procedure Laterality Date    HX OTHER SURGICAL      breast reduction         Family History:   Problem Relation Age of Onset    Glaucoma Father     Glaucoma Paternal Uncle     Glaucoma Paternal Uncle     Hypertension Mother     Diabetes Mother    Claudia Cordovar Arthritis-rheumatoid Mother        Social History     Social History    Marital status:      Spouse name: N/A    Number of children: N/A    Years of education: N/A     Occupational History    Not on file. Social History Main Topics    Smoking status: Never Smoker    Smokeless tobacco: Never Used    Alcohol use No    Drug use: No    Sexual activity: Not on file     Other Topics Concern    Not on file     Social History Narrative         ALLERGIES: Amoxicillin; Pravastatin; and Zocor [simvastatin]    Review of Systems   Constitutional: Negative. Negative for chills, diaphoresis and fever. HENT: Negative. Eyes: Negative. Respiratory: Negative. Negative for shortness of breath. Cardiovascular: Negative. Negative for chest pain. Gastrointestinal: Negative for nausea. Endocrine: Negative. Genitourinary: Negative. Musculoskeletal: Negative. Skin: Negative. Allergic/Immunologic: Negative. Neurological: Positive for syncope. Negative for seizures, weakness, numbness and headaches. Hematological: Negative. Psychiatric/Behavioral: Negative. All other systems reviewed and are negative. Vitals:    10/27/17 2214   BP: 107/81   Pulse: 90   Resp: 18   Temp: 98.3 °F (36.8 °C)   SpO2: 100%            Physical Exam   Constitutional: She is oriented to person, place, and time. She appears well-developed and well-nourished. Obese. HENT:   Head: Normocephalic and atraumatic. No tongue laceration. Eyes: EOM are normal. Pupils are equal, round, and reactive to light. Neck: Normal range of motion. Neck supple. Cardiovascular: Normal rate, regular rhythm and normal heart sounds. Pulmonary/Chest: Effort normal and breath sounds normal. No respiratory distress. Abdominal: Soft. There is no tenderness. Obese. Musculoskeletal: Normal range of motion. Neurological: She is alert and oriented to person, place, and time. Skin: Skin is warm and dry. No urine soiling. Psychiatric: She has a normal mood and affect. Her speech is normal.   Nursing note and vitals reviewed. MDM  Number of Diagnoses or Management Options  Diagnosis management comments: Ddx includes syncope, rhythm disturbance, orthostasis, seizure, other etiologies   This 52year old female has had prior episodes of LOC. I will begin the workup, obtain labs and diagnostics, treat as indicated and follow the the patient's condition and response. Amount and/or Complexity of Data Reviewed  Clinical lab tests: ordered  Tests in the radiology section of CPT®: ordered  Tests in the medicine section of CPT®: ordered    Risk of Complications, Morbidity, and/or Mortality  Presenting problems: moderate      ED Course       Procedures  Vitals:  Patient Vitals for the past 12 hrs:   Temp Pulse Resp BP SpO2   10/27/17 2214 98.3 °F (36.8 °C) 90 18 107/81 100 %       Medications Ordered:  Medications   potassium chloride (K-DUR, KLOR-CON) SR tablet 40 mEq (40 mEq Oral Given 10/27/17 2336)       Lab Findings:  Recent Results (from the past 12 hour(s))   CARDIAC PANEL,(CK, CKMB & TROPONIN)    Collection Time: 10/27/17 10:50 PM   Result Value Ref Range    CK 81 26 - 192 U/L    CK - MB <1.0 <3.6 ng/ml    CK-MB Index  0.0 - 4.0 %     CALCULATION NOT PERFORMED WHEN RESULT IS BELOW LINEAR LIMIT    Troponin-I, Qt. <0.02 0.0 - 0.045 NG/ML   CBC WITH AUTOMATED DIFF    Collection Time: 10/27/17 10:50 PM   Result Value Ref Range    WBC 8.3 4.6 - 13.2 K/uL    RBC 4.13 (L) 4.20 - 5.30 M/uL    HGB 12.1 12.0 - 16.0 g/dL    HCT 36.3 35.0 - 45.0 %    MCV 87.9 74.0 - 97.0 FL    MCH 29.3 24.0 - 34.0 PG    MCHC 33.3 31.0 - 37.0 g/dL    RDW 13.2 11.6 - 14.5 %    PLATELET 841 098 - 064 K/uL    MPV 10.2 9.2 - 11.8 FL    NEUTROPHILS 57 40 - 73 %    LYMPHOCYTES 27 21 - 52 %    MONOCYTES 8 3 - 10 %    EOSINOPHILS 7 (H) 0 - 5 %    BASOPHILS 1 0 - 2 %    ABS. NEUTROPHILS 4.8 1.8 - 8.0 K/UL    ABS. LYMPHOCYTES 2.2 0.9 - 3.6 K/UL    ABS. MONOCYTES 0.6 0.05 - 1.2 K/UL    ABS.  EOSINOPHILS 0.6 (H) 0.0 - 0.4 K/UL    ABS. BASOPHILS 0.0 0.0 - 0.1 K/UL    DF AUTOMATED     METABOLIC PANEL, BASIC    Collection Time: 10/27/17 10:50 PM   Result Value Ref Range    Sodium 132 (L) 136 - 145 mmol/L    Potassium 3.3 (L) 3.5 - 5.5 mmol/L    Chloride 98 (L) 100 - 108 mmol/L    CO2 26 21 - 32 mmol/L    Anion gap 8 3.0 - 18 mmol/L    Glucose 106 (H) 74 - 99 mg/dL    BUN 23 (H) 7.0 - 18 MG/DL    Creatinine 1.13 0.6 - 1.3 MG/DL    BUN/Creatinine ratio 20 12 - 20      GFR est AA >60 >60 ml/min/1.73m2    GFR est non-AA 52 (L) >60 ml/min/1.73m2    Calcium 9.0 8.5 - 10.1 MG/DL   HEPATIC FUNCTION PANEL    Collection Time: 10/27/17 10:50 PM   Result Value Ref Range    Protein, total 8.7 (H) 6.4 - 8.2 g/dL    Albumin 3.8 3.4 - 5.0 g/dL    Globulin 4.9 (H) 2.0 - 4.0 g/dL    A-G Ratio 0.8 0.8 - 1.7      Bilirubin, total 0.4 0.2 - 1.0 MG/DL    Bilirubin, direct 0.2 0.0 - 0.2 MG/DL    Alk.  phosphatase 82 45 - 117 U/L    AST (SGOT) 12 (L) 15 - 37 U/L    ALT (SGPT) 23 13 - 56 U/L   MAGNESIUM    Collection Time: 10/27/17 10:50 PM   Result Value Ref Range    Magnesium 1.8 1.6 - 2.6 mg/dL   CALCIUM, IONIZED    Collection Time: 10/27/17 10:50 PM   Result Value Ref Range    Ionized Calcium 1.09 (L) 1.12 - 1.32 MMOL/L   URINALYSIS W/ RFLX MICROSCOPIC    Collection Time: 10/27/17 11:16 PM   Result Value Ref Range    Color YELLOW      Appearance TURBID      Specific gravity 1.016 1.005 - 1.030      pH (UA) 5.0 5.0 - 8.0      Protein NEGATIVE  NEG mg/dL    Glucose NEGATIVE  NEG mg/dL    Ketone NEGATIVE  NEG mg/dL    Bilirubin NEGATIVE  NEG      Blood TRACE (A) NEG      Urobilinogen 0.2 0.2 - 1.0 EU/dL    Nitrites NEGATIVE  NEG      Leukocyte Esterase MODERATE (A) NEG     HCG URINE, QL    Collection Time: 10/27/17 11:16 PM   Result Value Ref Range    HCG urine, Ql. NEGATIVE  NEG     URINE MICROSCOPIC ONLY    Collection Time: 10/27/17 11:16 PM   Result Value Ref Range    WBC 0 to 5 0 - 4 /hpf    RBC 0 to 5 0 - 5 /hpf    Epithelial cells 1+ 0 - 5 /lpf Bacteria 3+ (A) NEG /hpf       EKG Interpretation by ED physician:  Sinus rhythm with 1st degree AV block, rate 86bpm. No STEMI. 11:26 PM     X-ray, CT or radiology findings or impressions:  XR CHEST PORT    (Results Pending)   CT HEAD WO CONT    (Results Pending)   ER physician interpretation of Chest X-ray:    No change from previous on 10/31/15. 11:33 PM     Progress notes, consult notes, or additional procedure notes:  Per co-workers now at bedside, witnessed episode this evening. Noticed patient with upper body jerking lasting 2-3 minutes; when she came to, patient was confused for about 20 minutes, was unable to correctly state her age and did not know what time of year it was. States she thought it was July. Patient reaffirms that she is under a lot of stress due to mother's passing one year ago and that her twin sister got bad news today. Will obtain CT Head and page Teleneurology for evaluation once CT is complete. 12:15 AM    Consult:  Discussed care with Patronus. Standard discussion; including history of patients chief complaint, available diagnostic results, and treatment course. Will evaluate the patient. 12:42 AM, 10/27/2017     Consult:  Discussed care with Patronus. Standard discussion; including history of patients chief complaint, available diagnostic results, and treatment course. Evaluated the patient; seizure vs. convulsive syncope. Due to recurrent episodes, recommend admission for MRI and EEG. Will consult with Hospitalist for admission. 12:52 AM, 10/27/2017      Reevaluation of the patient:   No further seizures noted    Diagnosis: No diagnosis found. Disposition: admitted, discussed with the hospitalist  Discussed with teleneurologist    Follow-up Information     None           Patient's Medications   Start Taking    No medications on file   Continue Taking    COMBIGAN 0.2-0.5 % DROP OPHTHALMIC SOLUTION        FUROSEMIDE (LASIX) 40 MG TABLET    Take 1 Tab by mouth daily. IBUPROFEN (MOTRIN) 800 MG TABLET    take 1 tablet by mouth every 8 hours if needed for pain    LISINOPRIL-HYDROCHLOROTHIAZIDE (PRINZIDE, ZESTORETIC) 20-25 MG PER TABLET    Take 1 Tab by mouth daily. POTASSIUM CHLORIDE (K-DUR, KLOR-CON) 20 MEQ TABLET    Take 1 Tab by mouth daily. PREDNISOLONE ACETATE (PRED FORTE) 1 % OPHTHALMIC SUSPENSION       These Medications have changed    No medications on file   Stop Taking    No medications on file       Scribe Attestation:     Kristie Call, acting as a scribe for and in the presence of Chet Blanca MD      October 27, 2017 at 10:27 PM       Provider Attestation:      I personally performed the services described in the documentation, reviewed the documentation, as recorded by the scribe in my presence, and it accurately and completely records my words and actions.  October 27, 2017 at 10:27 PM - Chet Blanca MD

## 2017-10-28 NOTE — CONSULTS
[address]    Name:  Garo Smyth  MR#:  [id number]  :  1970  Account #:  [de-identified]  Date of Adm:  10/27/2017  Date of Consultation:  10/28/2017      REASON FOR CONSULTATION: Evaluate for possible epilepsy. HISTORY OF PRESENT ILLNESS: A 70-year-old woman with multiple  medical comorbidities, who presents to the ER with a seizure or  possible syncopal event. The patient has little to no insight into what  happened. She reports that she was in her normal state of health when  she had this event. She denies any injury or incontinence whether  urinary or fecal. Apparently as documented on the ER notes co-  workers came to her side and found her jerking or convulsing  documented as \"2-3 minutes\" with post-event confusion for 20  minutes. The patient has been given Keppra. By documentation as  well as history obtained by the patient at bedside, she had a similar  event 1 year ago. Multiple medical problems including sarcoidosis. She does not have a  history of neurosarcoidosis, however, by her report. A type 2 diabetic. I  do not see documentation of what her glucose was in the field. First  glucose available here at the hospital shows a level of 106 mg per  deciliter. CURRENT MEDICATIONS: Include  1. Lasix. 2. Combigan eye drops for glaucoma. 3. Tylenol. 4. Norco.  5. Keppra given 1000 mg every 12 hours. 6. Levaquin. 7. Hydrochlorothiazide with Lisinopril. 8. Potassium replacement. 9. Prednisolone eyedrops. MEDICATION INTOLERANCES  1. AMOXICILLIN. 2. PRAVASTATIN. 3. ZOCOR. Apparently Pravastatin and Zocor cause syncope so the patient has  had episodes of loss of consciousness in the past.    No contributing family history. The patient does not have a history of  meningitis, encephalitis or significant head trauma or childhood  seizures.     REVIEW OF SYSTEMS: Includes difficulty controlling weight and  diffuse joint pain and feeling of achiness. PHYSICAL EXAMINATION  GENERAL: Exam shows a very overweight woman, pleasant, awake,  a little lethargic actually because she had sedation for MRI. VITAL SIGNS: Blood pressure 112/74. Pulse 75. Nontachypneic. NECK: Thick. CARDIORESPIRATORY: When she went to sleep I saw a brief apneic  episode. NEUROLOGIC: Cranial nerves 2 through 12 are intact. Unremarkable  and nondilated funduscopic exam. Visual fields intact. No drift. Finger  tapping intact. Coordination is intact. Reflexes without pathology. ASSESSMENT: Asked to evaluate if I think this patient has epilepsy. Not completely convinced that this is actually an epileptic event as  opposed to a syncopal event, so I think evaluation for syncope  etiologies are clearly needed. However, if the ER physician notes,  please see Dr. Mukesh Griffin note where the patient had \"upper body  jerking lasting 2-3 minutes\" is accurate as provided by witnesses this  would make seizure much more likely. It would make convulsive  syncope less likely. Shaista Guild has been started. I think this is reasonable. An MRI has been ordered. I do not see space occupying lesion. I do  not see significant atrophy. Await formal results. Question is there any  evidence of atrophy over on the right hemisphere. I did not see dural  enhancement that would be concerning for neurosarcoidosis. Once  again, await formal results. EEG on Monday. If wish to complete  workup outpatient and feel that this patient does not have a cause for  syncope, then completing workup outpatient is reasonable, or  otherwise could wait until Monday for EEG. The patient should not  drive. She should not take tub baths, but showers only. She should not  put herself in a situation where loss of consciousness would be life-  threatening, such as climbing ladders or swimming. It is her legal  responsibility to notify the [facility] Department of Motor Vehicles at the  ankles her events.  Until the [facility] Department of Motor Vehicles can  determine her privlidges to drive, she should not drive. I have no  further recommendations at this time. WILLIAM L. Raymondo Nick, MD Kristie Duverney / Joby Hall  D:  10/28/2017   12:22  T:  10/28/2017   13:40  Job #:  [id number]

## 2017-10-28 NOTE — DISCHARGE SUMMARY
[facility]  [facility] Summary       Patient: Fela Mcneill Age: 52 y.o. : 1970 MR#: [id number] SSN: [id number]  PCP on record: Manjit Tellez MD  Admit date: 10/27/2017  Discharge date: 10/28/2017    Disposition:    [x]Home   []Home with Home Health   []SNF/NH   []Rehab   []Home with family   []Alternate Facility:____________________    Discharge Diagnoses:                             1. Probable seizure disorder  2. Hypertension   3. Type 2 diabetes mellitus. 4. Sarcoidosis. 5. Hyperlipidemia. 6. Leukocytes per urinalysis without urinary symptoms. 7. Hypokalemia. 8. Morbid obesity. Discharge Medications:     Current Discharge Medication List      START taking these medications    Details   levETIRAcetam (KEPPRA) 1,000 mg tablet Take 1 Tab by mouth two (2) times a day. Qty: 60 Tab, Refills: 0         CONTINUE these medications which have CHANGED    Details   potassium chloride (K-DUR, KLOR-CON) 20 mEq tablet Take 1 Tab by mouth two (2) times a day. Qty: 60 Tab, Refills: 0         CONTINUE these medications which have NOT CHANGED    Details   cholecalciferol, vitamin D3, (VITAMIN D3) 2,000 unit tab Take  by mouth. Indications: Vitamin D Deficiency      furosemide (LASIX) 40 mg tablet Take 1 Tab by mouth daily. Qty: 30 Tab, Refills: 5      lisinopril-hydroCHLOROthiazide (PRINZIDE, ZESTORETIC) 20-25 mg per tablet Take 1 Tab by mouth daily.   Qty: 30 Tab, Refills: 5    Associated Diagnoses: Essential hypertension with goal blood pressure less than 140/90      COMBIGAN 0.2-0.5 % drop ophthalmic solution Refills: 0      prednisoLONE acetate (PRED FORTE) 1 % ophthalmic suspension Refills: 0         STOP taking these medications       ibuprofen (MOTRIN) 800 mg tablet Comments:   Reason for Stopping:             Consults:    - Neurology    Procedures:  -   N/A  Significant Diagnostic Studies:   -  MRI BRAIN W WO CONT on 10/28/2017:  FINDINGS:      Exam is moderately limited by motion artifact.     The ventricles are normal in size, shape and configuration for the patient's  stated age. Brain parenchyma demonstrates normal signal intensity throughout  all all imaging sequences for patient's stated age. No foci of susceptibility  change in the brain to suggest areas of remote microhemorrhage or pathologic  mineralization. No enhancing abnormalities of the brain parenchyma,  leptomeninges or dura. No foci of restricted diffusion to suggest areas of acute  ischemia. Flow voids are maintained in the major vessels at the base of the  skull and dural venous sinuses, suggesting patency. Empty sella variant of the  pituitary is noted. Otherwise, midline structures are unremarkable. No evidence  of extra axial fluid collection. Mild mucosal thickening scattered ethmoid air  cells, otherwise paranasal sinuses and mastoid air cells are well aerated.     IMPRESSION:     No evidence of an acute intracranial process.     Empty sella variant of the pituitary incidentally noted. DUPLEX LOWER EXT VENOUS BILAT on 10/28/2017  INTERPRETATION/FINDINGS  Duplex images were obtained using 2-D gray scale, color flow, and  spectral Doppler analysis. 1. No evidence of deep vein thrombosis from the right common femoral  to popliteal vein segment bilaterally. 2. Unable to perform adequate compression analysis of both calves  vessels secondary to body habitus. Patency of vessels demonstrated by  color flow and Doppler signal.Therefore, cannot rule out non-occlusive   thrombus in both calves veins. CT HEAD WO CONT on 10/27/2017:  FINDINGS: Beam hardening artifact from overlying hairpins limits evaluation. No  focal mass effect or shift of midline structures. No abnormal extra-axial  collection identified. Ventricles are normal in size and configuration. No acute  hemorrhage identified. No suspicious parenchymal lesions identified. Calvarium  intact.  Imaged paranasal sinuses and mastoid air cells well-aerated. IMPRESSION:     1. No clearly acute findings. Evaluation limited as above. Hospital Course by Problem   1. Probable seizure disorder - Prior to admission, patient was witnessed to be shaking for 2-3 minutes on floor prompting coming to hospital.  Patient was placed on keppra and neurology followed with patient. EKG consistent with that in 11/2016 for sinus rhythm first degree block, no evidence of orthostasis or DVT per ultrasound. Discussed with patient continuation of keppra, EEG on discharge. Per discussion with neurology fine to continue seizure workup in outpatient setting. 2. Hypertension - Home medications were continued while inpatient without any acute issues. There was no evidence of orthostatic hypotension. 3. Type 2 diabetes mellitus - diet controlled, patient continues to try and lose weight  4. Sarcoidosis - Chronic fatigue per patient report. Symptoms worse in late summer / humid weather. Consider formal workup of DAVID as outpatient if not already completed. 5. Hyperlipidemia - See patient has been intolerant of statins in the past and per review of medical record not to resume. Will defer further management to outpatient setting   6. Leukocytes per urinalysis without urinary symptoms - patient was initially placed on antibiotic but in absence of any urinary symptoms, leukocytosis or fevers, no antibiotics were continued upon discharge. 7. Hypokalemia - Mild hypokalemia upon admission in setting of diuretic use. Will increase daily potassium dosage and follow further as outpatient. 8. Morbid obesity - Significant weight gain report since diagnosis of sarcoidosis and steroid use starting approximately ten years ago. Encourage continued efforts at safe weight loss. Today's examination of the patient revealed:     Subjective:   Denies current complaints, feels much improved.   Denies dysuria, urgency / frequency  Objective:   VS:   Visit Vitals    BP 98/73 (BP 1 Location: Right arm, BP Patient Position: Lying right side)    Pulse 91    Temp 98.3 °F (36.8 °C)    Resp 17    SpO2 96%      Tmax/24hrs: Temp (24hrs), Av.1 °F (36.7 °C), Min:97.6 °F (36.4 °C), Max:98.3 °F (36.8 °C)     Input/Output: No intake or output data in the 24 hours ending 10/28/17 1801    General:  Alert, NAD  Cardiovascular:  RRR  Pulmonary:  LSC throughout; respiratory effort WNL  GI:  +BS in all four quadrants, soft, non-tender  Extremities:  Bilateral lower extremity edema; +pulses bilaterally. Neuro: strength normal and equal throughout; oriented x 4    Additional:      Labs:    Recent Results (from the past 24 hour(s))   CARDIAC PANEL,(CK, CKMB & TROPONIN)    Collection Time: 10/27/17 10:50 PM   Result Value Ref Range    CK 81 26 - 192 U/L    CK - MB <1.0 <3.6 ng/ml    CK-MB Index  0.0 - 4.0 %     CALCULATION NOT PERFORMED WHEN RESULT IS BELOW LINEAR LIMIT    Troponin-I, Qt. <0.02 0.0 - 0.045 NG/ML   CBC WITH AUTOMATED DIFF    Collection Time: 10/27/17 10:50 PM   Result Value Ref Range    WBC 8.3 4.6 - 13.2 K/uL    RBC 4.13 (L) 4.20 - 5.30 M/uL    HGB 12.1 12.0 - 16.0 g/dL    HCT 36.3 35.0 - 45.0 %    MCV 87.9 74.0 - 97.0 FL    MCH 29.3 24.0 - 34.0 PG    MCHC 33.3 31.0 - 37.0 g/dL    RDW 13.2 11.6 - 14.5 %    PLATELET 895 099 - 188 K/uL    MPV 10.2 9.2 - 11.8 FL    NEUTROPHILS 57 40 - 73 %    LYMPHOCYTES 27 21 - 52 %    MONOCYTES 8 3 - 10 %    EOSINOPHILS 7 (H) 0 - 5 %    BASOPHILS 1 0 - 2 %    ABS. NEUTROPHILS 4.8 1.8 - 8.0 K/UL    ABS. LYMPHOCYTES 2.2 0.9 - 3.6 K/UL    ABS. MONOCYTES 0.6 0.05 - 1.2 K/UL    ABS. EOSINOPHILS 0.6 (H) 0.0 - 0.4 K/UL    ABS.  BASOPHILS 0.0 0.0 - 0.1 K/UL    DF AUTOMATED     METABOLIC PANEL, BASIC    Collection Time: 10/27/17 10:50 PM   Result Value Ref Range    Sodium 132 (L) 136 - 145 mmol/L    Potassium 3.3 (L) 3.5 - 5.5 mmol/L    Chloride 98 (L) 100 - 108 mmol/L    CO2 26 21 - 32 mmol/L    Anion gap 8 3.0 - 18 mmol/L    Glucose 106 (H) 74 - 99 mg/dL    BUN 23 (H) 7.0 - 18 MG/DL    Creatinine 1.13 0.6 - 1.3 MG/DL    BUN/Creatinine ratio 20 12 - 20      GFR est AA >60 >60 ml/min/1.73m2    GFR est non-AA 52 (L) >60 ml/min/1.73m2    Calcium 9.0 8.5 - 10.1 MG/DL   HEPATIC FUNCTION PANEL    Collection Time: 10/27/17 10:50 PM   Result Value Ref Range    Protein, total 8.7 (H) 6.4 - 8.2 g/dL    Albumin 3.8 3.4 - 5.0 g/dL    Globulin 4.9 (H) 2.0 - 4.0 g/dL    A-G Ratio 0.8 0.8 - 1.7      Bilirubin, total 0.4 0.2 - 1.0 MG/DL    Bilirubin, direct 0.2 0.0 - 0.2 MG/DL    Alk.  phosphatase 82 45 - 117 U/L    AST (SGOT) 12 (L) 15 - 37 U/L    ALT (SGPT) 23 13 - 56 U/L   MAGNESIUM    Collection Time: 10/27/17 10:50 PM   Result Value Ref Range    Magnesium 1.8 1.6 - 2.6 mg/dL   CALCIUM, IONIZED    Collection Time: 10/27/17 10:50 PM   Result Value Ref Range    Ionized Calcium 1.09 (L) 1.12 - 1.32 MMOL/L   URINALYSIS W/ RFLX MICROSCOPIC    Collection Time: 10/27/17 11:16 PM   Result Value Ref Range    Color YELLOW      Appearance TURBID      Specific gravity 1.016 1.005 - 1.030      pH (UA) 5.0 5.0 - 8.0      Protein NEGATIVE  NEG mg/dL    Glucose NEGATIVE  NEG mg/dL    Ketone NEGATIVE  NEG mg/dL    Bilirubin NEGATIVE  NEG      Blood TRACE (A) NEG      Urobilinogen 0.2 0.2 - 1.0 EU/dL    Nitrites NEGATIVE  NEG      Leukocyte Esterase MODERATE (A) NEG     HCG URINE, QL    Collection Time: 10/27/17 11:16 PM   Result Value Ref Range    HCG urine, Ql. NEGATIVE  NEG     URINE MICROSCOPIC ONLY    Collection Time: 10/27/17 11:16 PM   Result Value Ref Range    WBC 0 to 5 0 - 4 /hpf    RBC 0 to 5 0 - 5 /hpf    Epithelial cells 1+ 0 - 5 /lpf    Bacteria 3+ (A) NEG /hpf   EKG, 12 LEAD, SUBSEQUENT    Collection Time: 10/27/17 11:24 PM   Result Value Ref Range    Ventricular Rate 86 BPM    Atrial Rate 86 BPM    P-R Interval 250 ms    QRS Duration 80 ms    Q-T Interval 358 ms    QTC Calculation (Bezet) 428 ms    Calculated P Axis 23 degrees    Calculated R Axis 13 degrees    Calculated T Axis 17 degrees    Diagnosis       Sinus rhythm with 1st degree AV block  Otherwise normal ECG  When compared with ECG of 04-NOV-2016 11:11,  No significant change was found  Confirmed by Shanika Alejandro MD ([id number]) on 10/28/2017 10:29:16 AM     GLUCOSE, POC    Collection Time: 10/28/17  6:29 AM   Result Value Ref Range    Glucose (POC) 119 (H) 70 - 110 mg/dL   D DIMER    Collection Time: 10/28/17  4:33 PM   Result Value Ref Range    D DIMER 0.90 (H) <0.46 ug/ml(FEU)     Additional Data Reviewed:     Condition:   Follow-up Appointments:   PCP in 1 week.    Neurology Dr. Louann Middleton in 2-3 weeks (soonest available)    >30 minutes spent coordinating this discharge (review instructions/follow-up, prescriptions, preparing report for sign off)    Signed:  Zachary Horta NP  10/28/2017  6:01 PM

## 2017-10-28 NOTE — ED NOTES
TRANSFER - OUT REPORT:    Verbal report given to Aniketnal  (name) on Geovanna Masker  being transferred to  (unit) for routine progression of care       Report consisted of patients Situation, Background, Assessment and   Recommendations(SBAR). Information from the following report(s) SBAR, Kardex and ED Summary was reviewed with the receiving nurse. Lines:   Peripheral IV 10/27/17 Right Antecubital (Active)   Site Assessment Clean, dry, & intact 10/27/2017 10:53 PM   Phlebitis Assessment 0 10/27/2017 10:53 PM   Infiltration Assessment 0 10/27/2017 10:53 PM   Dressing Status Clean, dry, & intact 10/27/2017 10:53 PM   Dressing Type Transparent 10/27/2017 10:53 PM   Hub Color/Line Status Blue;Flushed;Patent 10/27/2017 10:53 PM        Opportunity for questions and clarification was provided.       Patient transported with:   Registered Nurse

## 2017-10-28 NOTE — H&P
[facility]  ROUTINE H AND PS    Name:  Prabhu Stokes  MR#:  [id number]  :  1970  Account #:  [de-identified]  Date of Adm:  10/27/2017      CHIEF COMPLAINT: Change in mental status. HISTORY OF PRESENT ILLNESS: The patient is a 26-year-old black  female who apparently was sitting at the kitchen table today and next  woke up on the floor. She had no memory of what happened, but  apparently friends or relatives saw her having, what appeared to be, a  grand mal seizure. This happened approximately a year ago. She was  not treated for it at that time. She has had no problems since that time. 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 She has a history of hypertension, type 2 diabetes mellitus,  sarcoidosis, hyperlipidemia. REVIEW OF SYSTEMS  CONSTITUTIONAL: Her weight has been stable for the past several  months. She has had no fevers. HEENT: No problems with vision, visual field cuts, tinnitus, difficulty  with pain in her face, postnasal drip. RESPIRATORY: Sarcoid apparently involves her lungs, but she has  had no treatment for it recently. CARDIOVASCULAR: No chest pain, orthopnea, paroxysmal nocturnal  dyspnea, pedal edema. GASTROINTESTINAL: No dysphagia, hematemesis, or melena. GENITOURINARY: No urologic abnormalities or gynecologic  problems. ENDOCRINOLOGIC: Her diabetes has been mentioned. HEMATOLOGIC: No coagulopathies or thromboembolic phenomenon. ORTHOPEDIC: No bone pain, pain or swelling involving any of her  joints, or myopathies. SKIN: No history of easy bruisability or pruritus. NEUROLOGIC: Has been mentioned above. PAST MEDICAL HISTORY: As mentioned above. SOCIAL HISTORY: She does not smoke or use alcohol. MEDICATIONS: Her medication is mentioned on her admission  documentation. ALLERGIES: SHE IS ALLERGIC TO:  1. AMOXICILLIN. 2. PRAVASTATIN. 3. ZOCOR.     FAMILY HISTORY: Positive for congestive failure, hypertension, and  diabetes. PHYSICAL EXAMINATION  GENERAL: When I saw her, she was an extraordinarily obese black  female. Her height was 5 feet 9 inches. Weight was not done. VITAL SIGNS: Blood pressure was 107/81, respiratory rate was 18,  pulse was 90 and regular, and she was afebrile. HEENT: Unremarkable. Pharynx was clear. She had no evidence of a  tongue laceration. NECK: Supple. CHEST: Revealed good airway movement bilaterally. She had no  wheezes, rales, or rhonchi. CARDIAC: Revealed PMI to be in the 5th intercostal space,  midclavicular line. S1 and S2 were normal. She had no S3, S4, or  murmur. ABDOMEN: Soft. She was extraordinarily obese as I said. GENITALIA: Normal.  RECTAL: Not done. EXTREMITIES: Revealed trace to +1 pedal edema bilaterally. NEUROLOGIC: Examination at the time I saw her was intact. IMPRESSION:  1. Probable seizure disorder. 2. Hypertension. 3. Type 2 diabetes mellitus. 4. Sarcoidosis. 5. Hyperlipidemia. 6. Urinary tract infection. 7. Hypokalemia. 8. Morbid obesity. PLAN: The patient is to be admitted to the hospital. A CT of her head  was done in the ER, which is normal. MRI of her head is to be done  after admission. EEG to be scheduled. Will give her IV Keppra tonight  and follow her. With regard to her blood pressure, her blood pressure  presently is adequate and we will just continue to treat with her home  medications. with regard to 2 diabetes mellitus, her blood sugars are  not significantly elevated at this point, so will just order a sliding scale  with regular insulin. With regard to her sarcoid, this has been  noted. With regard to her hyperlipidemia, this has been noted. SHE IS  ALLERGIC TO 2 DIFFERENT TYPES STATIN DRUGS. With regard to  her urinary tract infection, this will be cultured. She will be started on  Levaquin IV in view of her PENICILLIN ALLERGY and with regard to  her hypokalemia, this will be corrected with oral potassium.  The usual  housekeeping procedures to include a PPI for stress ulcer prophylaxis  and a heparin compound for DVT prophylaxis.         Mikal Garcia MD    TS / CHAYA  D:  10/28/2017   01:51  T:  10/28/2017   07:34  Job #:  [id number]

## 2017-10-28 NOTE — ROUTINE PROCESS
TRANSFER - IN REPORT:    Verbal report received from [facility] RN(name) on Vinnie Odell  being received from ED(unit) for routine progression of care      Report consisted of patients Situation, Background, Assessment and   Recommendations(SBAR). Information from the following report(s) ED Summary, MAR and Recent Results was reviewed with the receiving nurse. Opportunity for questions and clarification was provided. Assessment completed upon patients arrival to unit and care assumed.      Primary Nurse Luigi Hammonds RN and ***, RN performed a dual skin assessment on this patient {S SKIN ASSESMENT:97805}  Clemente score is {CLEMENTE SCALE:28344}

## 2017-10-28 NOTE — PROCEDURES
[facility] ***    Name: Rj Govea  MRN: [id number]    Inpatient  : 18 Mar 1970  HIS Order #: [id number]  [address] [facility] Visit #: [id number]  Date: 28 Oct 2017    TYPE OF TEST: Peripheral Venous Testing    REASON FOR TEST  Limb swelling    Right Leg:-  Deep venous thrombosis:           No  Superficial venous thrombosis:    No  Deep venous insufficiency:        Not examined  Superficial venous insufficiency: Not examined    Left Leg:-  Deep venous thrombosis:           No  Superficial venous thrombosis:    Not examined  Deep venous insufficiency:        Not examined  Superficial venous insufficiency: Not examined      INTERPRETATION/FINDINGS  Duplex images were obtained using 2-D gray scale, color flow, and  spectral Doppler analysis. 1. No evidence of deep vein thrombosis from the right common femoral  to popliteal vein segment bilaterally. 2. Unable to perform adequate compression analysis of both calves  vessels secondary to body habitus. Patency of vessels demonstrated by  color flow and Doppler signal.Therefore, cannot rule out non-occlusive   thrombus in both calves veins. ADDITIONAL COMMENTS    I have personally reviewed the data relevant to the interpretation of  this  study. TECHNOLOGIST: Smith Burgess, [facility], RVT/  Signed: 10/28/2017 02:36 PM    PHYSICIAN: Bear Wiley D.O.   Signed: 10/31/2017 09:14 AM

## 2017-10-28 NOTE — ED TRIAGE NOTES
Pt. Reports to ED via [facility] S/P witnessed seizure without previous hx. Per medics pt has no PMH and no meds. When questioned by provider pt is on K for leg cramps, A/Ox3 at this time, tearful and anxious.

## 2017-10-28 NOTE — DISCHARGE INSTRUCTIONS
Discharge Instructions    Patient: Heidi Rdz MRN: [id number]  CSN: [id number]    YOB: 1970  Age: 52 y.o.   Sex: female    DOA: 10/27/2017 LOS:  LOS: 0 days   Discharge Date:      DIET:  Diabetic Diet    ACTIVITY: Activity as tolerated    ADDITIONAL INFORMATION: If you experience any of the following symptoms but not limited to Fever, chills, nausea, vomiting, diarrhea, change in mentation, falling, bleeding, shortness of breath, chest pain, please call your primary care physician or return to the emergency room if you cannot get hold of your doctor:     FOLLOW UP CARE:  PCP in 1 week.    Neurology Dr. Kalli Gong in 1-2 weeks or next available    Do NOT [address], NP  10/28/2017 6:00 PM

## 2017-10-29 NOTE — PROGRESS NOTES
Pt A&Ox4, VSS, denies pain throughout the shift. Seizure precautions maintained. Pt had MRI this morning, requiring 1mg of IV Ativan for claustrophobia. Tolerated well. Pt also had bilateral lower extremity dopplers to R/O DVT, negative. D-dimer resulted. Hx of lymphedema. Orthostatic BPs taken and documented (see documented VS). Patient OK for discharge and follow-up as outpatient per Neuro Dr. Doc Duane and Dr. Cynthia Dugan, NP. Discharge instructions given to patient. Verbal order from EMELI Hilton to change neurology follow up to 1-2 weeks from now, order modified. Unable to edit discharge instructions. Hand written on D/C instructions, copy made, patient label sticker added to be scanned by Medical Records into the chart. Explained at length to patient and patient's sister that it is safe for the patient to go home, no driving, tub baths, or any activity where a LOC could be fatal (i.e. Swimming, climbing ladders as per Dr. Doc Duane note) until cleared by Neurology. Discussed with NP Heidi Hilton and patient/sister that Heidi Hilton will follow-up with phone call to patient tomorrow regarding Return to Work note and clarification on EEG study with Neurology (per note, EEG Monday). Given print out on PO Keppra medication from [employer] website as unable to add directly to discharge instructions. Prescriptions for PO Keppra and increased dose of potassium transmitted to [employer] on file, patient to . Given the time/nature of this patient's discharge, given unit phone number for questions as this RN will be here throughout Monday. Again, to receive phone call from NP re: follow-up and further clarification. Otherwise, no further issues or clinical concerns. I have reviewed discharge instructions with the patient and sister. The patient and sister verbalized understanding. Patient armband removed and shredded.   Discharge medications reviewed with patient and sister and appropriate educational materials and side effects teaching were provided. Patient scheduled for transport via wheelchair to [facility], awaiting transport at time of this note. Pt to be driven home by sister at the bedside who will be staying with her overnight.

## 2017-10-30 ENCOUNTER — TELEPHONE (OUTPATIENT)
Dept: OTHER | Age: 47
End: 2017-10-30

## 2017-11-03 ENCOUNTER — OFFICE VISIT (OUTPATIENT)
Dept: FAMILY MEDICINE CLINIC | Age: 47
End: 2017-11-03

## 2017-11-03 VITALS
HEART RATE: 75 BPM | TEMPERATURE: 98.6 F | SYSTOLIC BLOOD PRESSURE: 122 MMHG | OXYGEN SATURATION: 100 % | HEIGHT: 69 IN | RESPIRATION RATE: 18 BRPM | DIASTOLIC BLOOD PRESSURE: 72 MMHG

## 2017-11-03 DIAGNOSIS — R56.9 SEIZURE (HCC): Primary | ICD-10-CM

## 2017-11-03 RX ORDER — IBUPROFEN 800 MG/1
TABLET ORAL
Refills: 0 | COMMUNITY
Start: 2017-10-23 | End: 2018-02-19 | Stop reason: SDUPTHER

## 2017-11-03 NOTE — LETTER
NOTIFICATION RETURN TO WORK / SCHOOL 
 
11/3/2017 10:25 AM 
 
Ms. Dominic Lawrence [facility] [id number] To Whom It May Concern: 
 
[facility] the [facility] She will return to work/school on: 11/27/17 If there are questions or concerns please have the patient contact our office. Sincerely, Lindsey Early MD

## 2017-11-03 NOTE — PATIENT INSTRUCTIONS

## 2017-11-03 NOTE — MR AVS SNAPSHOT
Visit Information Date & Time Provider Department Dept. Phone Encounter #  
 11/3/2017  9:30 AM Andrew Scott, [address] [place] [address] [place] [address] [facility] [id number] Follow-up Instructions Return if symptoms worsen or fail to improve. Your Appointments 11/22/2017  3:20 PM  
Follow Up with Erin Cook MD  
[facility] [address]) Appt Note: hosp FU [facility] Seizures [address] [place] [id number] [phone]  
  
   
 [place] [id number]  
  
    
 2/19/2018 10:30 AM  
Office Visit with Andrew Scott MD  
[address]) Appt Note: 4mo f/u  
 [address], [place] [address] [address]  
[phone]  
  
   
 [address] Upcoming Health Maintenance Date Due  
 FOOT EXAM Q1 3/18/1980 EYE EXAM RETINAL OR DILATED Q1 3/18/1980 Pneumococcal 19-64 Medium Risk (1 of 1 - PPSV23) 3/18/1989 DTaP/Tdap/Td series (1 - Tdap) 3/18/1991 PAP AKA CERVICAL CYTOLOGY 3/18/1991 INFLUENZA AGE 9 TO ADULT 8/1/2017 HEMOGLOBIN A1C Q6M 4/23/2018 MICROALBUMIN Q1 10/23/2018 LIPID PANEL Q1 10/23/2018 Allergies as of 11/3/2017  Review Complete On: 11/3/2017 By: Lucila Caraballo Severity Noted Reaction Type Reactions Amoxicillin  05/20/2013    Hives Pravastatin  11/11/2016    Myalgia, Other (comments) Syncope Zocor [Simvastatin]  06/06/2016    Other (comments) Syncope Current Immunizations  Never Reviewed No immunizations on file. Not reviewed this visit You Were Diagnosed With   
  
 Codes Comments Seizure ([facility] [address]    -  Primary ICD-10-CM: R56.9 ICD-9-CM: 780.39 Vitals BP Pulse Temp Resp Height(growth percentile) SpO2  
 122/72 (BP 1 Location: Left arm, BP Patient Position: Sitting) 75 98.6 °F (37 °C) (Oral) 18 5' 9\" (1.753 m) 100% Smoking Status Never Smoker Vitals History [facility]erred Pharm[facility] Phone [facility] [address] [facility] [address] [facility] [phone] Your Updated Medication List  
  
   
This list is accurate as of: 11/3/17 10:26 AM.  Always use your most recent med list.  
  
  
  
  
 COMBIGAN 0.2-0.5 % Drop ophthalmic solution Generic drug:  brimonidine-timolol  
  
 furosemide 40 mg tablet Commonly known as:  LASIX Take 1 Tab by mouth daily. ibuprofen 800 mg tablet Commonly known as:  MOTRIN  
take 1 tablet by mouth every 8 hours if needed for pain  
  
 levETIRAcetam 1,000 mg tablet Commonly known as:  KEPPRA Take 1 Tab by mouth two (2) times a day. lisinopril-hydroCHLOROthiazide 20-25 mg per tablet Commonly known as:  Sb [place] Take 1 Tab by mouth daily. * OTHER This is to certify that Kendrick Diaz was admitted to [facility] from 10/27/2017 to 10/28/2017. She may may return to work on 11/06/2017. Please feel free to contact my office if you have any questions or concerns. Thank you. * OTHER Outpatient EEG Diagnosis: Seizure disorder Results to Dr. Sindi Enrique  
  
 potassium chloride 20 mEq tablet Commonly known as:  K-DUR, KLOR-CON Take 1 Tab by mouth two (2) times a day. prednisoLONE acetate 1 % ophthalmic suspension Commonly known as:  PRED FORTE  
  
 VITAMIN D3 2,000 unit Tab Generic drug:  cholecalciferol (vitamin D3) Take  by mouth. Indications: Vitamin D Deficiency * Notice: This list has 2 medication(s) that are the same as other medications prescribed for you. Read the directions carefully, and ask your doctor or other care provider to review them with you. Follow-up Instructions Return if symptoms worsen or fail to improve. To-Do List   
 11/10/2017 8:00 AM  
  Appointment with [facility] EEG RM 1 at [facility] ([phone]) All patients (\"Awake only\" and \"Awake & Asleep\"): 1) Hair must be clean, free of oils, gels, spray, mousse. 2) Do not consume any caffeine products (coffee, tea, soda, chocolate) for 24 hours prior to test. 3) Have someone available to drive the patient home if patient is sedated. 4) May take medications or eat meals prior to study as normal.  If doing AWAKE ONLY --- There are no sleep instructions for this test and meals are allowed. If doing AWAKE & ASLEEP:  patient must stay up until 2:00 am and then wake up at 6:00 am on the day of study (sleep 4 hours only), without the aid of Caffeine. Additional instructions for \"Awake & Asleep\" only study: ADULT patients should ONLY get four (4) hours of sleep the night prior to study. (Preferred: Stay awake until 2:00 am and sleep until 6:00 am). CHILDREN should ONLY get five (5) hours of sleep the night prior to study. (Preferred: Stay awake until 12:00 midnight and sleep until 5:00 am) Appointments scheduled before 3:00pm:  Please arrive in the [address] [place] [address] and check in with the registrar 15 minutes prior to your scheduled appointment time. This is the same entrance as the [facility], facing [address] Parking: turn off [address] onto [address]. Proceed one block and make a right onto [address] [place] will be on your left). Go to the end of [address] and parking is located on your left. Appointments scheduled at 3:00pm or later:  Registration in the [address] [place] [address] CLOSES at 3:00 p.m. Patients should report to Patient Access/Admitting located on the left after entering the MAIN entrance of [facility]. Patient should plan to arrive 20 minutes prior to their appointment time if going to Patient Access/Admitting. After test, patient may exit from the [address] [place] [address] or [facility] Entrance. 11/10/2017 10:30 AM  
  Appointment with [facility]IGEL CASTILLO RM 1 at [facility] ([phone]) PAYMENT  For Non-Medicare patients - $15.00 will be collected from you at the time of your exam.  You will be billed $35.00 from the reading [facility]. OUTSIDE FILMS  - Any outside films related to the study being scheduled should be brought with you on the day of the exam.  If this cannot be done there may be a delay in the reading of the study. MEDICATIONS  - Patient must bring a complete list of all medications currently taking to include prescriptions, over-the-counter meds, herbals, vitamins & any dietary supplements  GENERAL INSTRUCTIONS  - On the day of your exam do not use any bath powder, deodorant or lotions on the armpit area. -Tenderness of breasts may cause an increase of discomfort during procedure. If you are experiencing breast tenderness on the day of your appointment and would like to reschedule, please call [phone]. Patient Instructions A Healthy Lifestyle: Care Instructions Your Care Instructions A healthy lifestyle can help you feel good, stay at a healthy weight, and have plenty of energy for both work and play. A healthy lifestyle is something you can share with your whole family. A healthy lifestyle also can lower your risk for serious health problems, such as high blood pressure, heart disease, and diabetes. You can follow a few steps listed below to improve your health and the health of your family. Follow-up care is a key part of your treatment and safety. Be sure to make and go to all appointments, and call your doctor if you are having problems. It's also a good idea to know your test results and keep a list of the medicines you take. How can you care for yourself at home? · Do not eat too much sugar, fat, or fast foods. You can still have dessert and treats now and then. The goal is moderation. · Start small to improve your eating habits. Pay attention to portion sizes, drink less juice and soda pop, and eat more fruits and vegetables. ¨ Eat a healthy amount of food. A 3-ounce serving of meat, for example, is about the size of a deck of cards. Fill the rest of your plate with vegetables and whole grains. ¨ Limit the amount of soda and sports drinks you have every day. Drink more water when you are thirsty. ¨ Eat at least 5 servings of fruits and vegetables every day. It may seem like a lot, but it is not hard to reach this goal. A serving or helping is 1 piece of fruit, 1 cup of vegetables, or 2 cups of leafy, raw vegetables. Have an apple or some carrot sticks as an afternoon snack instead of a candy bar. Try to have fruits and/or vegetables at every meal. 
· Make exercise part of your daily routine. You may want to start with simple activities, such as walking, bicycling, or slow swimming. Try to be active 30 to 60 minutes every day. You do not need to do all 30 to 60 minutes all at once. For example, you can exercise 3 times a day for 10 or 20 minutes. Moderate exercise is safe for most people, but it is always a good idea to talk to your doctor before starting an exercise program. 
· Keep moving. [place] Vacathers the lawn, work in the garden, or [employer]. Take the stairs instead of the elevator at work. · If you smoke, quit. People who smoke have an increased risk for heart attack, stroke, cancer, and other lung illnesses. Quitting is hard, but there are ways to boost your chance of quitting tobacco for good. ¨ Use nicotine gum, patches, or lozenges. ¨ Ask your doctor about stop-smoking programs and medicines. ¨ Keep trying. In addition to reducing your risk of diseases in the future, you will notice some benefits soon after you stop using tobacco. If you have shortness of breath or asthma symptoms, they will likely get better within a few weeks after you quit. · Limit how much alcohol you drink. Moderate amounts of alcohol (up to 2 drinks a day for men, 1 drink a day for women) are okay.  But drinking too much can lead to liver problems, high blood pressure, and other health problems. Family health If you have a family, there are many things you can do together to improve your health. · Eat meals together as a family as often as possible. · Eat healthy foods. This includes fruits, vegetables, lean meats and dairy, and whole grains. · Include your family in your fitness plan. Most people think of activities such as jogging or tennis as the way to fitness, but there are many ways you and your family can be more active. Anything that makes you breathe hard and gets your heart pumping is exercise. Here are some tips: 
¨ Walk to do errands or to take your child to school or the bus. ¨ Go for a family bike ride after dinner instead of watching TV. Where can you learn more? Go to http://nathanael-karon.info/. Enter I618 in the search box to learn more about \"A Healthy Lifestyle: Care Instructions. \" Current as of: May 12, 2017 Content Version: 11.4 © 2773-9115 [employer]. Care instructions adapted under license by [employer] (which disclaims liability or warranty for this information). If you have questions about a medical condition or this instruction, always ask your healthcare professional. [place] [address] any warranty or liability for your use of this information. Introducing [facility]! Mary Jo Houston introduces [employer] patient portal. Now you can access parts of your medical record, email your doctor's office, and request medication refills online. 1. In your internet browser, go to https://[employer]. [employer] 2. Click on the First Time User? Click Here link in the Sign In box. You will see the New Member Sign Up page. 3. Enter your [employer] Access Code exactly as it appears below. You will not need to use this code after youve completed the sign-up process.  If you do not sign up before the expiration date, you must request a new code. · FAD ? IO Access Code: [email or web address] Expires: 12/27/2017 10:43 AM 
 
4. Enter the last four digits of your Social Security Number (xxxx) and Date of Birth (mm/dd/yyyy) as indicated and click Submit. You will be taken to the next sign-up page. 5. Create a FAD ? IO ID. This will be your FAD ? IO login ID and cannot be changed, so think of one that is secure and easy to remember. 6. Create a FAD ? IO password. You can change your password at any time. 7. Enter your Password Reset Question and Answer. This can be used at a later time if you forget your password. 8. Enter your e-mail address. You will receive e-mail notification when new information is available in [address] 9. Click Sign Up. You can now view and download portions of your medical record. 10. Click the Download Summary menu link to download a portable copy of your medical information. If you have questions, please visit the Frequently Asked Questions section of the FAD ? IO website. Remember, FAD ? IO is NOT to be used for urgent needs. For medical emergencies, dial 911. Now available from your iPhone and Android! Please provide this summary of care documentation to your next provider. Your primary care clinician is listed as LEO BROWN. If you have any questions after today's visit, please call [phone].

## 2017-11-03 NOTE — PROGRESS NOTES
Tal Mcnair is a  52 y.o. female presents today for office visit for routine follow up. MV    1. Have you been to the ER, urgent care clinic or hospitalized since your last visit? YES [facility] 27 OCT 2017      2. Have you seen or consulted any other health care providers outside of the [address] [place] [address] since your last visit (Include any pap smears or colon screening)?  NO

## 2017-11-10 ENCOUNTER — HOSPITAL ENCOUNTER (OUTPATIENT)
Dept: NEUROLOGY | Age: 47
Discharge: HOME | End: 2017-11-10
Attending: NURSE PRACTITIONER
Payer: COMMERCIAL

## 2017-11-10 DIAGNOSIS — R41.82 ALTERED MENTAL STATUS: ICD-10-CM

## 2017-11-10 PROCEDURE — 95816 EEG AWAKE AND DROWSY: CPT

## 2017-11-13 NOTE — PROCEDURES
[place]    Name:  Ivan Olivas  MR#:  [id number]  :  1970  Account #:  [de-identified]  Date of Adm:  11/10/2017  Date of Service:  11/10/2017      REFERRING PHYSICIAN: Chaparrita Harrington NP    EEG examination is requested due to a history of possible seizures. CURRENT MEDICATIONS INCLUDE:  1. Keppra. 2. Lasix. 3. Lisinopril. 4. Vitamin D.  5. Motrin. 6. Potassium chloride. EEG examination was performed as an outpatient, utilizing both  referential and differential montages, as well as International 10/20  electrode placement system on an 18-channel EEG instrument. The  patient was initially awake. She demonstrates up to a posteriorly  dominant and reactive alpha rhythm of 10 Hz. She does enter into  drowsiness, but not in the deeper stages of sleep. Hyperventilation  was not performed. Intermittent photic stimulation and mental alerting  failed to elicit abnormal activity. On single channel EKG monitoring, no  cardiac ectopy was noted. ELECTROENCEPHALOGRAM INTERPRETATION: Normal awake  and light sleep recording for age.         MD Lavon Garsia / Hanny Langley  D:  2017   12:56  T:  2017   16:45  Job #:  [id number]
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
## 2017-11-22 ENCOUNTER — OFFICE VISIT (OUTPATIENT)
Dept: NEUROLOGY | Age: 47
End: 2017-11-22

## 2017-11-22 VITALS
SYSTOLIC BLOOD PRESSURE: 124 MMHG | HEART RATE: 81 BPM | DIASTOLIC BLOOD PRESSURE: 72 MMHG | HEIGHT: 69 IN | OXYGEN SATURATION: 98 % | RESPIRATION RATE: 20 BRPM | TEMPERATURE: 99.2 F

## 2017-11-22 DIAGNOSIS — R56.9 SEIZURE (HCC): Primary | ICD-10-CM

## 2017-11-22 DIAGNOSIS — R56.9 SEIZURES (HCC): ICD-10-CM

## 2017-11-22 RX ORDER — LEVETIRACETAM 1000 MG/1
1000 TABLET ORAL 2 TIMES DAILY
Qty: 60 TAB | Refills: 11 | Status: SHIPPED | OUTPATIENT
Start: 2017-11-22 | End: 2018-11-24 | Stop reason: SDUPTHER

## 2017-11-22 NOTE — PROGRESS NOTES
Re:  Enrique Class up visit     11/22/2017 3:21 PM    SSN: [id number]    Subjective:   Estefani Arnett returns for follow up. She was seen in the hospital for seizure activity back in October 27, 2017. Prior to that she had several episodes of altered consciousness and possible seizure activity. This was the first spell of definitive seizure activity. She's had no spells since being on the Keppra. Medications:    Current Outpatient Prescriptions   Medication Sig Dispense Refill    ibuprofen (MOTRIN) 800 mg tablet take 1 tablet by mouth every 8 hours if needed for pain  0    cholecalciferol, vitamin D3, (VITAMIN D3) 2,000 unit tab Take  by mouth. Indications: Vitamin D Deficiency      potassium chloride (K-DUR, KLOR-CON) 20 mEq tablet Take 1 Tab by mouth two (2) times a day. 60 Tab 0    levETIRAcetam (KEPPRA) 1,000 mg tablet Take 1 Tab by mouth two (2) times a day. 60 Tab 0    furosemide (LASIX) 40 mg tablet Take 1 Tab by mouth daily. 30 Tab 5    lisinopril-hydroCHLOROthiazide (PRINZIDE, ZESTORETIC) 20-25 mg per tablet Take 1 Tab by mouth daily. 30 Tab 5    COMBIGAN 0.2-0.5 % drop ophthalmic solution   0    prednisoLONE acetate (PRED FORTE) 1 % ophthalmic suspension   0    OTHER EEG  Dx: suspected seizure disorder  Fax results to Dr. Farzana Moe and Dr. Yandy Peoples 1 Each 0    OTHER EEG  Dx: Suspected seizure disorder  Fax results to Dr. Farzana Moe and Dr. Yandy Peoples 1 Each 0    OTHER This is to certify that Boubacar Waterman was admitted to [facility] from 10/27/2017 to 10/28/2017. She may may return to work on 11/06/2017. Please feel free to contact my office if you have any questions or concerns. Thank you.  1 Each 0    OTHER Outpatient EEG  Diagnosis: Seizure disorder  Results to Dr. Yandy Peoples 1 Each 0       Vital signs:    Visit Vitals    /72 (BP 1 Location: Right arm, BP Patient Position: Sitting)    Pulse 81    Temp 99.2 °F (37.3 °C) (Oral)    Resp 20    Ht 5' 9\" (1.753 m)  SpO2 98%       Review of Systems:   As above otherwise 11 point review of systems negative including;   Constitutional no fever or chills  Skin denies rash or itching  HEENT  Denies tinnitus, hearing lose  Eyes denies diplopia vision lose  Respiratory denies sortness of breath  Cardiovascular denies chest pain, dyspnea on exertion  Gastrointestinal denies nausea, vomiting, diarrhea, constipation  Genitourinary denies incontinence  Musculoskeletal denies joint pain or swelling  Endocrine denies weight change  Hematology denies easy bruising or bleeding   Neurological as above in HPI      Patient Active Problem List   Diagnosis Code    Sarcoidosis D86.9    Borderline diabetes R73.03    Glaucoma H40.9    Morbid obesity due to excess calories ([address] E66.01    High cholesterol E78.00    DM (diabetes mellitus), type 2, uncontrolled ([address] E11.65    Seizure ([facility] R56.9    Seizures ([facility] R56.9         Objective: The patient is awake, alert, and oriented x 4. Fund of knowledge is adequate. Speech is fluent and memory is intact. Cranial Nerves: II - Visual fields are full to confrontation. III, IV, VI - Extraocular movements are intact. There is no nystagmus. V - Facial sensation is intact to pinprick. VII - Face is symmetrical.  VIII - Hearing is present. IX, X, XII - Palate is symmetrical.   XI - Shoulder shrugging and head turning intact  Motor: The patient moves all four limbs fairly well and symmetrically. Tone is normal. Reflexes are 2+ and symmetrical. Plantars are down going. Gait is normal.    Final result (Exam End: 10/28/2017 11:23 AM) Reviewed    Study Result   MR BRAIN WITH AND WITHOUT CONTRAST     CPT CODE: 80650     HISTORY: Epilepsy, hypertension, type 2 diabetes.     COMPARISON: Head CT 10/28/2017.     TECHNIQUE: Brain scanned with axial and sagittal T1W scans, axial T2W scans,  axial diffusion weighted images, and post gadolinium axial and coronal T1W  scans.  15 cc intravenous Gadavist was administered for this exam.     FINDINGS:      Exam is moderately limited by motion artifact.     The ventricles are normal in size, shape and configuration for the patient's  stated age. Brain parenchyma demonstrates normal signal intensity throughout  all all imaging sequences for patient's stated age. No foci of susceptibility  change in the brain to suggest areas of remote microhemorrhage or pathologic  mineralization. No enhancing abnormalities of the brain parenchyma,  leptomeninges or dura. No foci of restricted diffusion to suggest areas of acute  ischemia. Flow voids are maintained in the major vessels at the base of the  skull and dural venous sinuses, suggesting patency. Empty sella variant of the  pituitary is noted. Otherwise, midline structures are unremarkable. No evidence  of extra axial fluid collection. Mild mucosal thickening scattered ethmoid air  cells, otherwise paranasal sinuses and mastoid air cells are well aerated.     IMPRESSION  IMPRESSION:     No evidence of an acute intracranial process.     Empty sella variant of the pituitary incidentally noted.               I have reviewed the above imagines myself. [place]     Name:  Debbi Kennedy  MR#:  [id number]  :  1970  Account #:  [de-identified]  Date of Adm:  11/10/2017  Date of Service:  11/10/2017        REFERRING PHYSICIAN: Chelsea Gama     EEG examination is requested due to a history of possible seizures.     CURRENT MEDICATIONS INCLUDE:  1. Keppra. 2. Lasix. 3. Lisinopril. 4. Vitamin D.  5. Motrin. 6. Potassium chloride.     EEG examination was performed as an outpatient, utilizing both  referential and differential montages, as well as International 10/20  electrode placement system on an 18-channel EEG instrument. The  patient was initially awake. She demonstrates up to a posteriorly  dominant and reactive alpha rhythm of 10 Hz.  She does enter into  drowsiness, but not in the deeper stages of sleep. Hyperventilation  was not performed. Intermittent photic stimulation and mental alerting  failed to elicit abnormal activity. On single channel EKG monitoring, no  cardiac ectopy was noted.     ELECTROENCEPHALOGRAM INTERPRETATION: Normal awake  and light sleep recording for age.           Katrin Stapleton MD    CBC:   Lab Results   Component Value Date/Time    WBC 8.3 10/27/2017 10:50 PM    RBC 4.13 10/27/2017 10:50 PM    HGB 12.1 10/27/2017 10:50 PM    HCT 36.3 10/27/2017 10:50 PM    PLATELET 566 21/95/6437 10:50 PM     BMP:   Lab Results   Component Value Date/Time    Glucose 106 10/27/2017 10:50 PM    Sodium 132 10/27/2017 10:50 PM    Potassium 3.3 10/27/2017 10:50 PM    Chloride 98 10/27/2017 10:50 PM    CO2 26 10/27/2017 10:50 PM    BUN 23 10/27/2017 10:50 PM    Creatinine 1.13 10/27/2017 10:50 PM    Calcium 9.0 10/27/2017 10:50 PM     CMP:   Lab Results   Component Value Date/Time    Glucose 106 10/27/2017 10:50 PM    Sodium 132 10/27/2017 10:50 PM    Potassium 3.3 10/27/2017 10:50 PM    Chloride 98 10/27/2017 10:50 PM    CO2 26 10/27/2017 10:50 PM    BUN 23 10/27/2017 10:50 PM    Creatinine 1.13 10/27/2017 10:50 PM    Calcium 9.0 10/27/2017 10:50 PM    Anion gap 8 10/27/2017 10:50 PM    BUN/Creatinine ratio 20 10/27/2017 10:50 PM    Alk. phosphatase 82 10/27/2017 10:50 PM    Protein, total 8.7 10/27/2017 10:50 PM    Albumin 3.8 10/27/2017 10:50 PM    Globulin 4.9 10/27/2017 10:50 PM    A-G Ratio 0.8 10/27/2017 10:50 PM     Coagulation: No results found for: PTP, INR, APTT, PTTT  Cardiac markers:   Lab Results   Component Value Date/Time    CK 81 10/27/2017 10:50 PM    CK-MB Index  10/27/2017 10:50 PM     CALCULATION NOT PERFORMED WHEN RESULT IS BELOW LINEAR LIMIT       Assessment:  New onset seizure activity who has risk factors including none. She's doing well currently on the [address].     Plan:  Continue current medication and return here in 6 months. Sincerely,        Milana Smith.  Rena Almaraz M.D.

## 2017-11-22 NOTE — MR AVS SNAPSHOT
Visit Information Date & Time Provider Department Dept. Phone Encounter #  
 11/22/2017  3:20 PM Karyn Tobin, [address] [phone] [id number] Follow-up Instructions Return in about 6 months (around 5/22/2018). Follow-up and Disposition History Your Appointments 2/19/2018 10:30 AM  
Office Visit with Chen Odonnell MD  
[address] [facility]) Appt Note: 4mo f/u  
 [address], [place] [address] [address]  
[phone]  
  
   
 [address] Upcoming Health Maintenance Date Due  
 FOOT EXAM Q1 3/18/1980 EYE EXAM RETINAL OR DILATED Q1 3/18/1980 Pneumococcal 19-64 Medium Risk (1 of 1 - PPSV23) 3/18/1989 DTaP/Tdap/Td series (1 - Tdap) 3/18/1991 PAP AKA CERVICAL CYTOLOGY 3/18/1991 Influenza Age 5 to Adult 8/1/2017 HEMOGLOBIN A1C Q6M 4/23/2018 MICROALBUMIN Q1 10/23/2018 LIPID PANEL Q1 10/23/2018 Allergies as of 11/22/2017  Review Complete On: 11/22/2017 By: Ab Lyons LPN Severity Noted Reaction Type Reactions Amoxicillin  05/20/2013    Hives Pravastatin  11/11/2016    Myalgia, Other (comments) Syncope Zocor [Simvastatin]  06/06/2016    Other (comments) Syncope Current Immunizations  Never Reviewed No immunizations on file. Not reviewed this visit You Were Diagnosed With   
  
 Codes Comments Seizure ([facility]    -  Primary ICD-10-CM: R56.9 ICD-9-CM: 780.39 Seizures ([address]     ICD-10-CM: R56.9 ICD-9-CM: 780.39 Vitals BP Pulse Temp Resp Height(growth percentile) SpO2  
 124/72 (BP 1 Location: Right arm, BP Patient Position: Sitting) 81 99.2 °F (37.3 °C) (Oral) 20 5' 9\" (1.753 m) 98% OB Status Smoking Status Postmenopausal Never Smoker Vitals History Preferred Pharmacy Pharmacy Name Phone [facility] [place]gram, [address] [phone] Your Updated Medication List  
  
   
This list is accurate as of: 11/22/17  3:32 PM.  Always use your most recent med list.  
  
  
  
  
 COMBIGAN 0.2-0.5 % Drop ophthalmic solution Generic drug:  brimonidine-timolol  
  
 furosemide 40 mg tablet Commonly known as:  LASIX Take 1 Tab by mouth daily. ibuprofen 800 mg tablet Commonly known as:  MOTRIN  
take 1 tablet by mouth every 8 hours if needed for pain  
  
 levETIRAcetam 1,000 mg tablet Commonly known as:  KEPPRA Take 1 Tab by mouth two (2) times a day. Indications: TONIC-CLONIC EPILEPSY TREATMENT ADJUNCT  
  
 lisinopril-hydroCHLOROthiazide 20-25 mg per tablet Commonly known as:  Angelica Hodgkin Take 1 Tab by mouth daily. * OTHER This is to certify that Chloé Frost was admitted to [facility] from 10/27/2017 to 10/28/2017. She may may return to work on 11/06/2017. Please feel free to contact my office if you have any questions or concerns. Thank you. * OTHER Outpatient EEG Diagnosis: Seizure disorder Results to Dr. Kyler Bahena * OTHER  
EEG Dx: Suspected seizure disorder Fax results to Dr. Mirta Mendez and Dr. Kyler Bahena * OTHER  
EEG Dx: suspected seizure disorder Fax results to Dr. Mirta Mendez and Dr. Kyler Bahena  
  
 potassium chloride 20 mEq tablet Commonly known as:  K-DUR, KLOR-CON Take 1 Tab by mouth two (2) times a day. prednisoLONE acetate 1 % ophthalmic suspension Commonly known as:  PRED FORTE  
  
 VITAMIN D3 2,000 unit Tab Generic drug:  cholecalciferol (vitamin D3) Take  by mouth. Indications: Vitamin D Deficiency * Notice: This list has 4 medication(s) that are the same as other medications prescribed for you. Read the directions carefully, and ask your doctor or other care provider to review them with you. Prescriptions Sent to Pharmacy  Refills  
 levETIRAcetam (KEPPRA) 1,000 mg tablet 11  
 Sig: Take 1 Tab by mouth two (2) times a day. Indications: TONIC-CLONIC EPILEPSY TREATMENT ADJUNCT Class: Normal  
 Pharmacy: [id number] [facility]. Iraah Pete, [address]  #: [phone] Route: Oral  
  
Follow-up Instructions Return in about 6 months (around 5/22/2018). Introducing [facility]! Tomi Calvo introduces my[employer]reams patient portal. Now you can access parts of your medical record, email your doctor's office, and request medication refills online. 1. In your internet browser, go to [email or web address] 2. Click on the First Time User? Click Here link in the Sign In box. You will see the New Member Sign Up page. 3. Enter your [employer]reams Access Code exactly as it appears below. You will not need to use this code after youve completed the sign-up process. If you do not sign up before the expiration date, you must request a new code. · [employer]reams Access Code: [email or web address] Expires: 12/27/2017  9:43 AM 
 
4. Enter the last four digits of your Social Security Number (xxxx) and Date of Birth (mm/dd/yyyy) as indicated and click Submit. You will be taken to the next sign-up page. 5. Create a my[employer]reams ID. This will be your my[employer]reams login ID and cannot be changed, so think of one that is secure and easy to remember. 6. Create a [employer]reams password. You can change your password at any time. 7. Enter your Password Reset Question and Answer. This can be used at a later time if you forget your password. 8. Enter your e-mail address. You will receive e-mail notification when new information is available in [address] 9. Click Sign Up. You can now view and download portions of your medical record. 10. Click the Download Summary menu link to download a portable copy of your medical information. If you have questions, please visit the Frequently Asked Questions section of the [employer]reams website.  Remember, my[employer]reams is NOT to be used for urgent needs. For medical emergencies, dial 911. Now available from your iPhone and Android! Please provide this summary of care documentation to your next provider. Your primary care clinician is listed as LEO BROWN. If you have any questions after today's visit, please call [phone].

## 2017-11-22 NOTE — COMMUNICATION BODY
Re:  Nichol [place] up visit     11/22/2017 3:21 PM    SSN: [id number]    Subjective:   Dana Gann returns for follow up. She was seen in the hospital for seizure activity back in October 27, 2017. Prior to that she had several episodes of altered consciousness and possible seizure activity. This was the first spell of definitive seizure activity. She's had no spells since being on the Keppra. Medications:    Current Outpatient Prescriptions   Medication Sig Dispense Refill    ibuprofen (MOTRIN) 800 mg tablet take 1 tablet by mouth every 8 hours if needed for pain  0    cholecalciferol, vitamin D3, (VITAMIN D3) 2,000 unit tab Take  by mouth. Indications: Vitamin D Deficiency      potassium chloride (K-DUR, KLOR-CON) 20 mEq tablet Take 1 Tab by mouth two (2) times a day. 60 Tab 0    levETIRAcetam (KEPPRA) 1,000 mg tablet Take 1 Tab by mouth two (2) times a day. 60 Tab 0    furosemide (LASIX) 40 mg tablet Take 1 Tab by mouth daily. 30 Tab 5    lisinopril-hydroCHLOROthiazide (PRINZIDE, ZESTORETIC) 20-25 mg per tablet Take 1 Tab by mouth daily. 30 Tab 5    COMBIGAN 0.2-0.5 % drop ophthalmic solution   0    prednisoLONE acetate (PRED FORTE) 1 % ophthalmic suspension   0    OTHER EEG  Dx: suspected seizure disorder  Fax results to Dr. Sven Morrissey and Dr. Sindi Enrique 1 Each 0    OTHER EEG  Dx: Suspected seizure disorder  Fax results to Dr. Sven Morrissey and Dr. Sindi Enrique 1 Each 0    OTHER This is to certify that Kendrick Diaz was admitted to [facility] from 10/27/2017 to 10/28/2017. She may may return to work on 11/06/2017. Please feel free to contact my office if you have any questions or concerns. Thank you.  1 Each 0    OTHER Outpatient EEG  Diagnosis: Seizure disorder  Results to Dr. Sindi Enrique 1 Each 0       Vital signs:    Visit Vitals    /72 (BP 1 Location: Right arm, BP Patient Position: Sitting)    Pulse 81    Temp 99.2 °F (37.3 °C) (Oral)    Resp 20    Ht 5' 9\" (1.753 m)  SpO2 98%       Review of Systems:   As above otherwise 11 point review of systems negative including;   Constitutional no fever or chills  Skin denies rash or itching  HEENT  Denies tinnitus, hearing lose  Eyes denies diplopia vision lose  Respiratory denies sortness of breath  Cardiovascular denies chest pain, dyspnea on exertion  Gastrointestinal denies nausea, vomiting, diarrhea, constipation  Genitourinary denies incontinence  Musculoskeletal denies joint pain or swelling  Endocrine denies weight change  Hematology denies easy bruising or bleeding   Neurological as above in HPI      Patient Active Problem List   Diagnosis Code    Sarcoidosis D86.9    Borderline diabetes R73.03    Glaucoma H40.9    Morbid obesity due to excess calories ([address] E66.01    High cholesterol E78.00    DM (diabetes mellitus), type 2, uncontrolled ([address] E11.65    Seizure ([facility] R56.9    Seizures ([facility] R56.9         Objective: The patient is awake, alert, and oriented x 4. Fund of knowledge is adequate. Speech is fluent and memory is intact. Cranial Nerves: II - Visual fields are full to confrontation. III, IV, VI - Extraocular movements are intact. There is no nystagmus. V - Facial sensation is intact to pinprick. VII - Face is symmetrical.  VIII - Hearing is present. IX, X, XII - Palate is symmetrical.   XI - Shoulder shrugging and head turning intact  Motor: The patient moves all four limbs fairly well and symmetrically. Tone is normal. Reflexes are 2+ and symmetrical. Plantars are down going. Gait is normal.    Final result (Exam End: 10/28/2017 11:23 AM) Reviewed    Study Result   MR BRAIN WITH AND WITHOUT CONTRAST     CPT CODE: 09232     HISTORY: Epilepsy, hypertension, type 2 diabetes.     COMPARISON: Head CT 10/28/2017.     TECHNIQUE: Brain scanned with axial and sagittal T1W scans, axial T2W scans,  axial diffusion weighted images, and post gadolinium axial and coronal T1W  scans.  15 cc intravenous Gadavist was administered for this exam.     FINDINGS:      Exam is moderately limited by motion artifact.     The ventricles are normal in size, shape and configuration for the patient's  stated age. Brain parenchyma demonstrates normal signal intensity throughout  all all imaging sequences for patient's stated age. No foci of susceptibility  change in the brain to suggest areas of remote microhemorrhage or pathologic  mineralization. No enhancing abnormalities of the brain parenchyma,  leptomeninges or dura. No foci of restricted diffusion to suggest areas of acute  ischemia. Flow voids are maintained in the major vessels at the base of the  skull and dural venous sinuses, suggesting patency. Empty sella variant of the  pituitary is noted. Otherwise, midline structures are unremarkable. No evidence  of extra axial fluid collection. Mild mucosal thickening scattered ethmoid air  cells, otherwise paranasal sinuses and mastoid air cells are well aerated.     IMPRESSION  IMPRESSION:     No evidence of an acute intracranial process.     Empty sella variant of the pituitary incidentally noted.               I have reviewed the above imagines myself. [place]     Name:  Arleth Herrera  MR#:  [id number]  :  1970  Account #:  [de-identified]  Date of Adm:  11/10/2017  Date of Service:  11/10/2017        REFERRING PHYSICIAN: Merline Mccoy     EEG examination is requested due to a history of possible seizures.     CURRENT MEDICATIONS INCLUDE:  1. Keppra. 2. Lasix. 3. Lisinopril. 4. Vitamin D.  5. Motrin. 6. Potassium chloride.     EEG examination was performed as an outpatient, utilizing both  referential and differential montages, as well as International 10/20  electrode placement system on an 18-channel EEG instrument. The  patient was initially awake. She demonstrates up to a posteriorly  dominant and reactive alpha rhythm of 10 Hz.  She does enter into  drowsiness, but not in the deeper stages of sleep. Hyperventilation  was not performed. Intermittent photic stimulation and mental alerting  failed to elicit abnormal activity. On single channel EKG monitoring, no  cardiac ectopy was noted.     ELECTROENCEPHALOGRAM INTERPRETATION: Normal awake  and light sleep recording for age.           Lisa Casarez MD    CBC:   Lab Results   Component Value Date/Time    WBC 8.3 10/27/2017 10:50 PM    RBC 4.13 10/27/2017 10:50 PM    HGB 12.1 10/27/2017 10:50 PM    HCT 36.3 10/27/2017 10:50 PM    PLATELET 045 38/44/2670 10:50 PM     BMP:   Lab Results   Component Value Date/Time    Glucose 106 10/27/2017 10:50 PM    Sodium 132 10/27/2017 10:50 PM    Potassium 3.3 10/27/2017 10:50 PM    Chloride 98 10/27/2017 10:50 PM    CO2 26 10/27/2017 10:50 PM    BUN 23 10/27/2017 10:50 PM    Creatinine 1.13 10/27/2017 10:50 PM    Calcium 9.0 10/27/2017 10:50 PM     CMP:   Lab Results   Component Value Date/Time    Glucose 106 10/27/2017 10:50 PM    Sodium 132 10/27/2017 10:50 PM    Potassium 3.3 10/27/2017 10:50 PM    Chloride 98 10/27/2017 10:50 PM    CO2 26 10/27/2017 10:50 PM    BUN 23 10/27/2017 10:50 PM    Creatinine 1.13 10/27/2017 10:50 PM    Calcium 9.0 10/27/2017 10:50 PM    Anion gap 8 10/27/2017 10:50 PM    BUN/Creatinine ratio 20 10/27/2017 10:50 PM    Alk. phosphatase 82 10/27/2017 10:50 PM    Protein, total 8.7 10/27/2017 10:50 PM    Albumin 3.8 10/27/2017 10:50 PM    Globulin 4.9 10/27/2017 10:50 PM    A-G Ratio 0.8 10/27/2017 10:50 PM     Coagulation: No results found for: PTP, INR, APTT, PTTT  Cardiac markers:   Lab Results   Component Value Date/Time    CK 81 10/27/2017 10:50 PM    CK-MB Index  10/27/2017 10:50 PM     CALCULATION NOT PERFORMED WHEN RESULT IS BELOW LINEAR LIMIT       Assessment:  New onset seizure activity who has risk factors including none. She's doing well currently on the [address].     Plan:  Continue current medication and return here in 6 months. Sincerely,        Miguelina Kc.  Filiberto Frost M.D.

## 2017-11-22 NOTE — LETTER
11/22/2017 3:29 PM 
 
Patient:  Lora Hemphill YOB: 1970 Date of Visit: 11/22/2017 Dear Debby Carolina MD 
[address] [facility] [address] [address] VIA In Basket 
 : Thank you for referring Ms. Aloysius Hodgkin to me for evaluation/treatment. Below are the relevant portions of my assessment and plan of care. Re:  Meghan Partlow up visit     11/22/2017 3:21 PM 
 
SSN: [id number] Subjective:   Lora Hemphill returns for follow up. She was seen in the hospital for seizure activity back in October 27, 2017. Prior to that she had several episodes of altered consciousness and possible seizure activity. This was the first spell of definitive seizure activity. She's had no spells since being on the Keppra. Medications:   
Current Outpatient Prescriptions Medication Sig Dispense Refill  ibuprofen (MOTRIN) 800 mg tablet take 1 tablet by mouth every 8 hours if needed for pain  0  
 cholecalciferol, vitamin D3, (VITAMIN D3) 2,000 unit tab Take  by mouth. Indications: Vitamin D Deficiency  potassium chloride (K-DUR, KLOR-CON) 20 mEq tablet Take 1 Tab by mouth two (2) times a day. 60 Tab 0  
 levETIRAcetam (KEPPRA) 1,000 mg tablet Take 1 Tab by mouth two (2) times a day. 60 Tab 0  
 furosemide (LASIX) 40 mg tablet Take 1 Tab by mouth daily. 30 Tab 5  
 lisinopril-hydroCHLOROthiazide (PRINZIDE, ZESTORETIC) 20-25 mg per tablet Take 1 Tab by mouth daily. 30 Tab 5  
 COMBIGAN 0.2-0.5 % drop ophthalmic solution   0  
 prednisoLONE acetate (PRED FORTE) 1 % ophthalmic suspension   0  
 OTHER EEG Dx: suspected seizure disorder Fax results to Dr. Pita Tello and Dr. Brook Lara 1 Each 0  
 OTHER EEG Dx: Suspected seizure disorder Fax results to Dr. Pita Tello and Dr. Brook Lara 1 Each 0  
 OTHER This is to certify that Aloysius Hodgkin was admitted to [facility] from 10/27/2017 to 10/28/2017. She may may return to work on 11/06/2017. Please feel free to contact my office if you have any questions or concerns. Thank you. 1 Each 0  
 OTHER Outpatient EEG Diagnosis: Seizure disorder Results to Dr. Melvina Ortiz 1 Each 0 Vital signs:   
Visit Vitals  /72 (BP 1 Location: Right arm, BP Patient Position: Sitting)  Pulse 81  Temp 99.2 °F (37.3 °C) (Oral)  Resp 20  
 Ht 5' 9\" (1.753 m)  SpO2 98% Review of Systems: As above otherwise 11 point review of systems negative including;  
Constitutional no fever or chills Skin denies rash or itching HEENT  Denies tinnitus, hearing lose Eyes denies diplopia vision lose Respiratory denies sortness of breath Cardiovascular denies chest pain, dyspnea on exertion Gastrointestinal denies nausea, vomiting, diarrhea, constipation Genitourinary denies incontinence Musculoskeletal denies joint pain or swelling Endocrine denies weight change Hematology denies easy bruising or bleeding Neurological as above in HPI Patient Active Problem List  
Diagnosis Code  Sarcoidosis D86.9  Borderline diabetes R73.03  
 Glaucoma H40.9  Morbid obesity due to excess calories ([facility]) E66.01  
 High cholesterol E78.00  DM (diabetes mellitus), type 2, uncontrolled ([address] E11.65  Seizure ([address] R56.9  Seizures ([address] R56.9 Objective: The patient is awake, alert, and oriented x 4. Fund of knowledge is adequate. Speech is fluent and memory is intact. Cranial Nerves: II  Visual fields are full to confrontation. III, IV, VI  Extraocular movements are intact. There is no nystagmus. V  Facial sensation is intact to pinprick. VII  Face is symmetrical.  VIII - Hearing is present. IX, X, XII  Palate is symmetrical.   XI - Shoulder shrugging and head turning intact Motor: The patient moves all four limbs fairly well and symmetrically. Tone is normal. Reflexes are 2+ and symmetrical. Plantars are down going.  Gait is normal. 
 
 Final result (Exam End: 10/28/2017 11:23 AM) Reviewed Study Result MR BRAIN WITH AND WITHOUT CONTRAST 
  
CPT CODE: 28934 
  
HISTORY: Epilepsy, hypertension, type 2 diabetes. 
  
COMPARISON: Head CT 10/28/2017. 
  
TECHNIQUE: Brain scanned with axial and sagittal T1W scans, axial T2W scans, 
axial diffusion weighted images, and post gadolinium axial and coronal T1W 
scans. 15 cc intravenous Gadavist was administered for this exam. 
  
FINDINGS:  
  
Exam is moderately limited by motion artifact. 
  
The ventricles are normal in size, shape and configuration for the patient's 
stated age. Brain parenchyma demonstrates normal signal intensity throughout 
all all imaging sequences for patient's stated age. No foci of susceptibility 
change in the brain to suggest areas of remote microhemorrhage or pathologic 
mineralization. No enhancing abnormalities of the brain parenchyma, 
leptomeninges or dura. No foci of restricted diffusion to suggest areas of acute 
ischemia. Flow voids are maintained in the major vessels at the base of the 
skull and dural venous sinuses, suggesting patency. Empty sella variant of the 
pituitary is noted. Otherwise, midline structures are unremarkable. No evidence 
of extra axial fluid collection. Mild mucosal thickening scattered ethmoid air 
cells, otherwise paranasal sinuses and mastoid air cells are well aerated. 
  
IMPRESSION IMPRESSION: 
  
No evidence of an acute intracranial process. 
  
Empty sella variant of the pituitary incidentally noted. 
  
  
  
   
I have reviewed the above imagines myself. [facility] 
  
Name:  Ayanna Evans 
MR#:  [id number] :  1970 Account #:  [de-identified] Date of Adm:  11/10/2017 Date of Service:  11/10/2017 
  
  
REFERRING PHYSICIAN: Amor Harrington NP 
  
EEG examination is requested due to a history of possible seizures. 
  
CURRENT MEDICATIONS INCLUDE: 
1. Keppra. 2. Lasix. 3. Lisinopril. 4. Vitamin D. 
5. Motrin. 6. Potassium chloride. 
  
EEG examination was performed as an outpatient, utilizing both 
referential and differential montages, as well as International 10/20 
electrode placement system on an 18-channel EEG instrument. The 
patient was initially awake. She demonstrates up to a posteriorly 
dominant and reactive alpha rhythm of 10 Hz. She does enter into 
drowsiness, but not in the deeper stages of sleep. Hyperventilation 
was not performed. Intermittent photic stimulation and mental alerting 
failed to elicit abnormal activity. On single channel EKG monitoring, no 
cardiac ectopy was noted. 
  
ELECTROENCEPHALOGRAM INTERPRETATION: Normal awake 
and light sleep recording for age. 
Hussein Gifford MD 
 
CBC:  
Lab Results Component Value Date/Time WBC 8.3 10/27/2017 10:50 PM  
 RBC 4.13 10/27/2017 10:50 PM  
 HGB 12.1 10/27/2017 10:50 PM  
 HCT 36.3 10/27/2017 10:50 PM  
 PLATELET 124 56/37/2144 10:50 PM  
 
BMP:  
Lab Results Component Value Date/Time Glucose 106 10/27/2017 10:50 PM  
 Sodium 132 10/27/2017 10:50 PM  
 Potassium 3.3 10/27/2017 10:50 PM  
 Chloride 98 10/27/2017 10:50 PM  
 CO2 26 10/27/2017 10:50 PM  
 BUN 23 10/27/2017 10:50 PM  
 Creatinine 1.13 10/27/2017 10:50 PM  
 Calcium 9.0 10/27/2017 10:50 PM  
 
CMP:  
Lab Results Component Value Date/Time Glucose 106 10/27/2017 10:50 PM  
 Sodium 132 10/27/2017 10:50 PM  
 Potassium 3.3 10/27/2017 10:50 PM  
 Chloride 98 10/27/2017 10:50 PM  
 CO2 26 10/27/2017 10:50 PM  
 BUN 23 10/27/2017 10:50 PM  
 Creatinine 1.13 10/27/2017 10:50 PM  
 Calcium 9.0 10/27/2017 10:50 PM  
 Anion gap 8 10/27/2017 10:50 PM  
 BUN/Creatinine ratio 20 10/27/2017 10:50 PM  
 Alk.  phosphatase 82 10/27/2017 10:50 PM  
 Protein, total 8.7 10/27/2017 10:50 PM  
 Albumin 3.8 10/27/2017 10:50 PM  
 Globulin 4.9 10/27/2017 10:50 PM  
 A-G Ratio 0.8 10/27/2017 10:50 PM  
 
 Coagulation: No results found for: PTP, INR, APTT, PTTT Cardiac markers:  
Lab Results Component Value Date/Time CK 81 10/27/2017 10:50 PM  
 CK-MB Index  10/27/2017 10:50 PM  
  CALCULATION NOT PERFORMED WHEN RESULT IS BELOW LINEAR LIMIT Assessment:  New onset seizure activity who has risk factors including none. She's doing well currently on the [address]. Plan:  Continue current medication and return here in 6 months. Sincerely, 
 
 
 
Dwight Gonzales. Joanna Blum M.D. If you have questions, please do not hesitate to call me. I look forward to following Ms. Peres along with you.  
 
 
 
Sincerely, 
 
 
Karyn Tobin MD

## 2017-11-24 ENCOUNTER — TELEPHONE (OUTPATIENT)
Dept: FAMILY MEDICINE CLINIC | Age: 47
End: 2017-11-24

## 2017-11-24 NOTE — LETTER
11/27/2017 9:09 AM 
 
Ms. Aranza Jefferson [id number] Ms Marina Pineda is unable to drive for 6 months starting 10/27/17 due to having a seizure. Sincerely, Steven Christine MD

## 2017-11-24 NOTE — TELEPHONE ENCOUNTER
Pt would like to know if she could get a letter stating that she is not to drive for 6 mos. Pt states that her neurologist Dr. Jacquie Box stated that she should get a letter from her PCP to give to her job. Please advise pt once letter is generated and asap.

## 2017-11-27 ENCOUNTER — TELEPHONE (OUTPATIENT)
Dept: FAMILY MEDICINE CLINIC | Age: 47
End: 2017-11-27

## 2017-11-27 NOTE — TELEPHONE ENCOUNTER
Spoke with patient informing her that letter is ready for . She wanted it faxed to her job, and I informed her that we are unable to do that due to [employer]. Patient states her sister will pick it up later.

## 2018-01-16 RX ORDER — PRAVASTATIN SODIUM 20 MG/1
TABLET ORAL
Qty: 30 TAB | Refills: 5 | OUTPATIENT
Start: 2018-01-16

## 2018-01-22 NOTE — TELEPHONE ENCOUNTER
Pt states that she can not get her medications timoteo to her payments being behind for her insurance. Pt states that she called them three times today and she offered to pay them $111.00 today and she will be paying the remainder $54.00 later next week. Pt stated that she asked for the reps to contact their supervisors to see if she could speak with them about releasing her medications, but they have yet to contact her. Pt want to know if there is anything that the office could do before they close today. Pt asking for office to give her insurance company a call.

## 2018-01-22 NOTE — TELEPHONE ENCOUNTER
Pt called back to state that she paid the $111.00. To her insurance. And would like a call back if nurse is able to call her insurance company today, before they leave.

## 2018-01-23 NOTE — TELEPHONE ENCOUNTER
Spoke with pharmacist and states patient is having trouble paying her insurance and is unable to get her medication. Pharmacist states she tried running prescriptions through discount cards but her medications are still expensive. At this point patient can contact Patient Assistance for each of her medication and apply for some assistance, or she can check with the local health department to be seen in order to get her medications. Left message for patient to call the office.

## 2018-01-23 NOTE — TELEPHONE ENCOUNTER
Pt calling re notes below.  She said it is an urgent matter and was expecting a return call yesterday

## 2018-02-16 RX ORDER — PRAVASTATIN SODIUM 20 MG/1
TABLET ORAL
Qty: 30 TAB | Refills: 5 | Status: SHIPPED | OUTPATIENT
Start: 2018-02-16 | End: 2018-04-20 | Stop reason: SINTOL

## 2018-02-19 DIAGNOSIS — I10 ESSENTIAL HYPERTENSION WITH GOAL BLOOD PRESSURE LESS THAN 140/90: ICD-10-CM

## 2018-02-19 DIAGNOSIS — R56.9 SEIZURE (HCC): ICD-10-CM

## 2018-02-19 RX ORDER — POTASSIUM CHLORIDE 20 MEQ/1
20 TABLET, EXTENDED RELEASE ORAL 2 TIMES DAILY
Qty: 60 TAB | Refills: 0 | Status: SHIPPED | OUTPATIENT
Start: 2018-02-19 | End: 2018-06-25 | Stop reason: SDUPTHER

## 2018-02-19 RX ORDER — LISINOPRIL AND HYDROCHLOROTHIAZIDE 20; 25 MG/1; MG/1
1 TABLET ORAL DAILY
Qty: 30 TAB | Refills: 5 | Status: SHIPPED | OUTPATIENT
Start: 2018-02-19 | End: 2018-08-18 | Stop reason: SDUPTHER

## 2018-02-19 RX ORDER — FUROSEMIDE 40 MG/1
40 TABLET ORAL DAILY
Qty: 30 TAB | Refills: 5 | Status: SHIPPED | OUTPATIENT
Start: 2018-02-19 | End: 2018-10-19 | Stop reason: SDUPTHER

## 2018-02-19 RX ORDER — IBUPROFEN 800 MG/1
TABLET ORAL
Qty: 90 TAB | Refills: 2 | Status: SHIPPED | OUTPATIENT
Start: 2018-02-19 | End: 2018-07-19 | Stop reason: SDUPTHER

## 2018-02-19 RX ORDER — LEVETIRACETAM 1000 MG/1
1000 TABLET ORAL 2 TIMES DAILY
Qty: 60 TAB | Refills: 11 | Status: CANCELLED | OUTPATIENT
Start: 2018-02-19

## 2018-04-20 ENCOUNTER — OFFICE VISIT (OUTPATIENT)
Dept: FAMILY MEDICINE CLINIC | Age: 48
End: 2018-04-20

## 2018-04-20 VITALS
HEART RATE: 76 BPM | HEIGHT: 69 IN | OXYGEN SATURATION: 99 % | BODY MASS INDEX: 43.4 KG/M2 | DIASTOLIC BLOOD PRESSURE: 76 MMHG | WEIGHT: 293 LBS | SYSTOLIC BLOOD PRESSURE: 114 MMHG | TEMPERATURE: 98.9 F | RESPIRATION RATE: 20 BRPM

## 2018-04-20 DIAGNOSIS — E66.01 MORBID OBESITY DUE TO EXCESS CALORIES (HCC): ICD-10-CM

## 2018-04-20 DIAGNOSIS — M79.671 RIGHT FOOT PAIN: ICD-10-CM

## 2018-04-20 DIAGNOSIS — E55.9 VITAMIN D DEFICIENCY: ICD-10-CM

## 2018-04-20 DIAGNOSIS — E78.00 HIGH CHOLESTEROL: ICD-10-CM

## 2018-04-20 DIAGNOSIS — I10 ESSENTIAL HYPERTENSION: ICD-10-CM

## 2018-04-20 RX ORDER — INDOMETHACIN 50 MG/1
50 CAPSULE ORAL
Qty: 60 CAP | Refills: 0 | Status: SHIPPED | OUTPATIENT
Start: 2018-04-20 | End: 2018-06-02 | Stop reason: SDUPTHER

## 2018-04-20 NOTE — PROGRESS NOTES
Patient is in the office today for toe and HTN follow up. 1. Have you been to the ER, urgent care clinic since your last visit? Hospitalized since your last visit? No    2. Have you seen or consulted any other health care providers outside of the [address] [place] [address] since your last visit? Include any pap smears or colon screening.  Yes Dr Coleman Dumont

## 2018-04-20 NOTE — MR AVS SNAPSHOT
[address] [facility] 
 
 
 [address] [address] [address] [facility] [address] 
[phone] Patient: Surjit Mendiola MRN: [id number] VVF:[id number] Visit Information Date & Time Provider Department Dept. Phone Encounter #  
 4/20/2018  2:30 PM Parth Rene, [address] [place] [address] [phone] [id number] Follow-up Instructions Return in about 4 months (around 8/20/2018). Your Appointments 5/21/2018 11:40 AM  
Follow Up with Naty Velasquez MD  
[address] [place] [address]) Appt Note: 6mon f/u  
 [address] [address] [phone]  
  
   
 [place] [id number] Upcoming Health Maintenance Date Due  
 FOOT EXAM Q1 3/18/1980 EYE EXAM RETINAL OR DILATED Q1 3/18/1980 Pneumococcal 19-64 Medium Risk (1 of 1 - PPSV23) 3/18/1989 DTaP/Tdap/Td series (1 - Tdap) 3/18/1991 PAP AKA CERVICAL CYTOLOGY 3/18/1991 HEMOGLOBIN A1C Q6M 4/23/2018 Influenza Age 5 to Adult 9/20/2018* MICROALBUMIN Q1 10/23/2018 LIPID PANEL Q1 10/23/2018 *Topic was postponed. The date shown is not the original due date. Allergies as of 4/20/2018  Review Complete On: 4/20/2018 By: Parth Rene MD  
  
 Severity Noted Reaction Type Reactions Amoxicillin  05/20/2013    Hives Pravastatin  11/11/2016    Myalgia, Other (comments) Syncope Zocor [Simvastatin]  06/06/2016    Other (comments) Syncope Current Immunizations  Never Reviewed No immunizations on file. Not reviewed this visit You Were Diagnosed With   
  
 Codes Comments Uncontrolled type 2 diabetes mellitus without complication, without long-term current use of insulin ([address]    -  Primary ICD-10-CM: E11.65 ICD-9-CM: 250.02 High cholesterol     ICD-10-CM: E78.00 ICD-9-CM: 272.0 Essential hypertension     ICD-10-CM: I10 
ICD-9-CM: 401.9 Right foot pain     ICD-10-CM: M79.671 ICD-9-CM: 729.5 Vitamin D deficiency     ICD-10-CM: E55.9 ICD-9-CM: 268.9 Vitals BP Pulse Temp Resp Height(growth percentile) Weight(growth percentile) 114/76 (BP 1 Location: Left arm, BP Patient Position: Sitting) 76 98.9 °F (37.2 °C) (Oral) 20 5' 9\" (1.753 m) (!) 440 lb (199.6 kg) SpO2 BMI OB Status Smoking Status 99% 64.98 kg/m2 Postmenopausal Never Smoker BMI and BSA Data Body Mass Index Body Surface Area 64.98 kg/m 2 3.12 m 2 Preferred Pharmacy Pharmacy Name Phone [id number] Chiqui Martinez, [address] [phone] Your Updated Medication List  
  
   
This list is accurate as of 4/20/18  2:38 PM.  Always use your most recent med list.  
  
  
  
  
 COMBIGAN 0.2-0.5 % Drop ophthalmic solution Generic drug:  brimonidine-timolol  
  
 furosemide 40 mg tablet Commonly known as:  LASIX Take 1 Tab by mouth daily. ibuprofen 800 mg tablet Commonly known as:  MOTRIN  
take 1 tablet by mouth every 8 hours if needed for pain  
  
 indomethacin 50 mg capsule Commonly known as:  INDOCIN Take 1 Cap by mouth three (3) times daily (after meals) for 90 days. levETIRAcetam 1,000 mg tablet Commonly known as:  KEPPRA Take 1 Tab by mouth two (2) times a day. Indications: TONIC-CLONIC EPILEPSY TREATMENT ADJUNCT  
  
 lisinopril-hydroCHLOROthiazide 20-25 mg per tablet Commonly known as:  Janas Dukes Take 1 Tab by mouth daily. * OTHER This is to certify that Onesimo Newman was [facility] to 10[facility]28[facility]2017. She may may return to work on 11/06/2017. Please feel free to contact my office if you have any questions or concerns. Thank you. * OTHER Outpatient EEG Diagnosis: Seizure disorder Results to Dr. Monica Cameron * OTHER  
EEG Dx: Suspected seizure disorder Fax results to Dr. Marilyn Santos and Dr. Monica Cameron  * OTHER  
 EEG Dx: suspected seizure disorder Fax results to Dr. Hemalatha Duque and Dr. Nahid Echevarria  
  
 potassium chloride 20 mEq tablet Commonly known as:  K-DUR, KLOR-CON Take 1 Tab by mouth two (2) times a day. pravastatin 20 mg tablet Commonly known as:  PRAVACHOL  
take 1 tablet by mouth NIGHTLY  
  
 prednisoLONE acetate 1 % ophthalmic suspension Commonly known as:  PRED FORTE  
  
 VITAMIN D3 2,000 unit Tab Generic drug:  cholecalciferol (vitamin D3) Take  by mouth. Indications: Vitamin D Deficiency * Notice: This list has 4 medication(s) that are the same as other medications prescribed for you. Read the directions carefully, and ask your doctor or other care provider to review them with you. Prescriptions Sent to Pharmacy Refills  
 indomethacin (INDOCIN) 50 mg capsule 0 Sig: Take 1 Cap by mouth three (3) times daily (after meals) for 90 days. Class: Normal  
 Pharmacy[id number] Celina Ng [address] [place] [address] #: [phone] Route: Oral  
  
Follow-up Instructions Return in about 4 months (around 8/20/2018). To-Do List   
 10/18/2018 Lab:  LIPID PANEL   
  
 10/18/2018 Lab:  METABOLIC PANEL, COMPREHENSIVE   
  
 10/20/2018 Lab:  HEMOGLOBIN A1C W/O EAG   
  
 10/20/2018 Lab:  VITAMIN D, 25 HYDROXY Patient Instructions High Blood Pressure: Care Instructions Your Care Instructions If your blood pressure is usually above 140/90, you have high blood pressure, or hypertension. That means the top number is 140 or higher or the bottom number is 90 or higher, or both. Despite what a lot of people think, high blood pressure usually doesn't cause headaches or make you feel dizzy or lightheaded. It usually has no symptoms. But it does increase your risk for heart attack, stroke, and kidney or eye damage. The higher your blood pressure, the more your risk increases. Your doctor will give you a goal for your blood pressure. Your goal will be based on your health and your age. An example of a goal is to keep your blood pressure below 140/90. Lifestyle changes, such as eating healthy and being active, are always important to help lower blood pressure. You might also take medicine to reach your blood pressure goal. 
Follow-up care is a key part of your treatment and safety. Be sure to make and go to all appointments, and call your doctor if you are having problems. It's also a good idea to know your test results and keep a list of the medicines you take. How can you care for yourself at home? Medical treatment · If you stop taking your medicine, your blood pressure will go back up. You may take one or more types of medicine to lower your blood pressure. Be safe with medicines. Take your medicine exactly as prescribed. Call your doctor if you think you are having a problem with your medicine. · Talk to your doctor before you start taking aspirin every day. Aspirin can help certain people lower their risk of a heart attack or stroke. But taking aspirin isn't right for everyone, because it can cause serious bleeding. · See your doctor regularly. You may need to see the doctor more often at first or until your blood pressure comes down. · If you are taking blood pressure medicine, talk to your doctor before you take decongestants or anti-inflammatory medicine, such as ibuprofen. Some of these medicines can raise blood pressure. · Learn how to check your blood pressure at home. Lifestyle changes · Stay at a healthy weight. This is especially important if you put on weight around the waist. Losing even 10 pounds can help you lower your blood pressure. · If your doctor recommends it, get more exercise. Walking is a good choice. Bit by bit, increase the amount you walk every day. Try for at least 30 minutes on most days of the week.  You also may want to swim, bike, or do other activities. · Avoid or limit alcohol. Talk to your doctor about whether you can drink any alcohol. · Try to limit how much sodium you eat to less than 2,300 milligrams (mg) a day. Your doctor may ask you to try to eat less than 1,500 mg a day. · Eat plenty of fruits (such as bananas and oranges), vegetables, legumes, whole grains, and low-fat dairy products. · Lower the amount of saturated fat in your diet. Saturated fat is found in animal products such as milk, cheese, and meat. Limiting these foods may help you lose weight and also lower your risk for heart disease. · Do not smoke. Smoking increases your risk for heart attack and stroke. If you need help quitting, talk to your doctor about stop-smoking programs and medicines. These can increase your chances of quitting for good. When should you call for help? Call 911 anytime you think you may need emergency care. This may mean having symptoms that suggest that your blood pressure is causing a serious heart or blood vessel problem. Your blood pressure may be over 180/110. ? For example, call 911 if: 
? · You have symptoms of a heart attack. These may include: ¨ Chest pain or pressure, or a strange feeling in the chest. 
¨ Sweating. ¨ Shortness of breath. ¨ Nausea or vomiting. ¨ Pain, pressure, or a strange feeling in the back, neck, jaw, or upper belly or in one or both shoulders or arms. ¨ Lightheadedness or sudden weakness. ¨ A fast or irregular heartbeat. ? · You have symptoms of a stroke. These may include: 
¨ Sudden numbness, tingling, weakness, or loss of movement in your face, arm, or leg, especially on only one side of your body. ¨ Sudden vision changes. ¨ Sudden trouble speaking. ¨ Sudden confusion or trouble understanding simple statements. ¨ Sudden problems with walking or balance. ¨ A sudden, severe headache that is different from past headaches. ? · You have severe back or belly pain. ?Do not wait until your blood pressure comes down on its own. Get help right away. ?Call your doctor now or seek immediate care if: 
? · Your blood pressure is much higher than normal (such as 180/110 or higher), but you don't have symptoms. ? · You think high blood pressure is causing symptoms, such as: ¨ Severe headache. ¨ Blurry vision. ? Watch closely for changes in your health, and be sure to contact your doctor if: 
? · Your blood pressure measures 140/90 or higher at least 2 times. That means the top number is 140 or higher or the bottom number is 90 or higher, or both. ? · You think you may be having side effects from your blood pressure medicine. ? · Your blood pressure is usually normal, but it goes above normal at least 2 times. Where can you learn more? Go to http://nathanael-karon.info/. Enter F807 in the search box to learn more about \"High Blood Pressure: Care Instructions. \" Current as of: September 21, 2016 Content Version: 11.4 © 6315-9635 [employer]. Care instructions adapted under license by [employer] (which disclaims liability or warranty for this information). If you have questions about a medical condition or this instruction, always ask your healthcare professional. [address] any warranty or liability for your use of this information. Introducing [facility]! Willadean Saint introduces youcalc patient portal. Now you can access parts of your medical record, email your doctor's office, and request medication refills online. 1. In your internet browser, go to [email or web address] 2. Click on the First Time User? Click Here link in the Sign In box. You will see the New Member Sign Up page. 3. Enter your youcalc Access Code exactly as it appears below. You will not need to use this code after youve completed the sign-up process.  If you do not sign up before the expiration date, you must request a new code. · [employer] Access Code: [email or web address] Expires: 4/26/2018 12:00 AM 
 
4. Enter the last four digits of your Social Security Number (xxxx) and Date of Birth (mm/dd/yyyy) as indicated and click Submit. You will be taken to the next sign-up page. 5. Create a [employer] ID. This will be your [employer] login ID and cannot be changed, so think of one that is secure and easy to remember. 6. Create a [employer] password. You can change your password at any time. 7. Enter your Password Reset Question and Answer. This can be used at a later time if you forget your password. 8. Enter your e-mail address. You will receive e-mail notification when new information is available in [address] 9. Click Sign Up. You can now view and download portions of your medical record. 10. Click the Download Summary menu link to download a portable copy of your medical information. If you have questions, please visit the Frequently Asked Questions section of the [employer] website. Remember, [employer] is NOT to be used for urgent needs. For medical emergencies, dial 911. Now available from your iPhone and Android! Please provide this summary of care documentation to your next provider. Your primary care clinician is listed as LEO BROWN. If you have any questions after today's visit, please call [phone].

## 2018-04-20 NOTE — PATIENT INSTRUCTIONS
High Blood Pressure: Care Instructions  Your Care Instructions    If your blood pressure is usually above 140/90, you have high blood pressure, or hypertension. That means the top number is 140 or higher or the bottom number is 90 or higher, or both. Despite what a lot of people think, high blood pressure usually doesn't cause headaches or make you feel dizzy or lightheaded. It usually has no symptoms. But it does increase your risk for heart attack, stroke, and kidney or eye damage. The higher your blood pressure, the more your risk increases. Your doctor will give you a goal for your blood pressure. Your goal will be based on your health and your age. An example of a goal is to keep your blood pressure below 140/90. Lifestyle changes, such as eating healthy and being active, are always important to help lower blood pressure. You might also take medicine to reach your blood pressure goal.  Follow-up care is a key part of your treatment and safety. Be sure to make and go to all appointments, and call your doctor if you are having problems. It's also a good idea to know your test results and keep a list of the medicines you take. How can you care for yourself at home? Medical treatment  · If you stop taking your medicine, your blood pressure will go back up. You may take one or more types of medicine to lower your blood pressure. Be safe with medicines. Take your medicine exactly as prescribed. Call your doctor if you think you are having a problem with your medicine. · Talk to your doctor before you start taking aspirin every day. Aspirin can help certain people lower their risk of a heart attack or stroke. But taking aspirin isn't right for everyone, because it can cause serious bleeding. · See your doctor regularly. You may need to see the doctor more often at first or until your blood pressure comes down.   · If you are taking blood pressure medicine, talk to your doctor before you take decongestants or anti-inflammatory medicine, such as ibuprofen. Some of these medicines can raise blood pressure. · Learn how to check your blood pressure at home. Lifestyle changes  · Stay at a healthy weight. This is especially important if you put on weight around the waist. Losing even 10 pounds can help you lower your blood pressure. · If your doctor recommends it, get more exercise. Walking is a good choice. Bit by bit, increase the amount you walk every day. Try for at least 30 minutes on most days of the week. You also may want to swim, bike, or do other activities. · Avoid or limit alcohol. Talk to your doctor about whether you can drink any alcohol. · Try to limit how much sodium you eat to less than 2,300 milligrams (mg) a day. Your doctor may ask you to try to eat less than 1,500 mg a day. · Eat plenty of fruits (such as bananas and oranges), vegetables, legumes, whole grains, and low-fat dairy products. · Lower the amount of saturated fat in your diet. Saturated fat is found in animal products such as milk, cheese, and meat. Limiting these foods may help you lose weight and also lower your risk for heart disease. · Do not smoke. Smoking increases your risk for heart attack and stroke. If you need help quitting, talk to your doctor about stop-smoking programs and medicines. These can increase your chances of quitting for good. When should you call for help? Call 911 anytime you think you may need emergency care. This may mean having symptoms that suggest that your blood pressure is causing a serious heart or blood vessel problem. Your blood pressure may be over 180/110. ? For example, call 911 if:  ? · You have symptoms of a heart attack. These may include:  ¨ Chest pain or pressure, or a strange feeling in the chest.  ¨ Sweating. ¨ Shortness of breath. ¨ Nausea or vomiting.   ¨ Pain, pressure, or a strange feeling in the back, neck, jaw, or upper belly or in one or both shoulders or arms.  ¨ Lightheadedness or sudden weakness. ¨ A fast or irregular heartbeat. ? · You have symptoms of a stroke. These may include:  ¨ Sudden numbness, tingling, weakness, or loss of movement in your face, arm, or leg, especially on only one side of your body. ¨ Sudden vision changes. ¨ Sudden trouble speaking. ¨ Sudden confusion or trouble understanding simple statements. ¨ Sudden problems with walking or balance. ¨ A sudden, severe headache that is different from past headaches. ? · You have severe back or belly pain. ?Do not wait until your blood pressure comes down on its own. Get help right away. ?Call your doctor now or seek immediate care if:  ? · Your blood pressure is much higher than normal (such as 180/110 or higher), but you don't have symptoms. ? · You think high blood pressure is causing symptoms, such as:  ¨ Severe headache. ¨ Blurry vision. ? Watch closely for changes in your health, and be sure to contact your doctor if:  ? · Your blood pressure measures 140/90 or higher at least 2 times. That means the top number is 140 or higher or the bottom number is 90 or higher, or both. ? · You think you may be having side effects from your blood pressure medicine. ? · Your blood pressure is usually normal, but it goes above normal at least 2 times. Where can you learn more? Go to http://nathanael-karon.info/. Enter W642 in the search box to learn more about \"High Blood Pressure: Care Instructions. \"  Current as of: September 21, 2016  Content Version: 11.4  © 8616-4653 [employer]. Care instructions adapted under license by [employer] (which disclaims liability or warranty for this information). If you have questions about a medical condition or this instruction, always ask your healthcare professional. [place] [address] any warranty or liability for your use of this information.

## 2018-04-20 NOTE — PROGRESS NOTES
Subjective:       Chief Complaint  The patient presents for follow up of diabetes, hypertension and high cholesterol. HPI  Henrry Farr is a 50 y.o. female seen for follow up of diabetes. Shealso has hypertension and hyperlipidemia. Diabetes well controlled, with diet, pt continues to struggle to lose weight, she is a good candidate for medical weight loss program but cannot do it due to financial reasons currently, hypertension well controlled, no significant medication side effects noted, on Zestoretic, hyperlipidemia poorly controlled, pt had syncope on multiple statins including Zocor and Pravachol. Would not try more statins with this pt. Pt has LDL~ 190 but did not qualify for Repatha    Diet and Lifestyle: not attempting to follow a low fat, low cholesterol diet, does not rigorously follow a diabetic diet, sedentary    Home BP Monitoring: is not measured at home. Diabetic Review of Systems - home glucose monitoring: is not performed. Other symptoms and concerns: pt has significant medical problems due to her morbid obesity including severe edema and lymphedema in her lower extremities that cuases pain and difficulty with pt walking and standing for more than 15 minutes. Pt also has h/o of sarcoidosis that contributes to shortness of breath along with her morbid obesity. Pt therefore gets very short of breath with walking even 1 block. Pt is currently being seen by neurology for seizures. She hopefully will be able to drive soon. Current Outpatient Prescriptions   Medication Sig    indomethacin (INDOCIN) 50 mg capsule Take 1 Cap by mouth three (3) times daily (after meals) for 90 days.  potassium chloride (K-DUR, KLOR-CON) 20 mEq tablet Take 1 Tab by mouth two (2) times a day.  furosemide (LASIX) 40 mg tablet Take 1 Tab by mouth daily.     ibuprofen (MOTRIN) 800 mg tablet take 1 tablet by mouth every 8 hours if needed for pain    lisinopril-hydroCHLOROthiazide (PRINZIDE, ZESTORETIC) 20-25 mg per tablet Take 1 Tab by mouth daily.  levETIRAcetam (KEPPRA) 1,000 mg tablet Take 1 Tab by mouth two (2) times a day. Indications: TONIC-CLONIC EPILEPSY TREATMENT ADJUNCT    OTHER EEG  Dx: suspected seizure disorder  Fax results to Dr. Brijesh Tellez and Dr. Reginaldo Pisano EEG  Dx: Suspected seizure disorder  Fax results to Dr. Brijesh Tellez and Dr. Reginaldo Pisano This is to certify that Nathan Tan was admitted to [facility] from 10/27/2017 to 10/28/2017. She may may return to work on 11/06/2017. Please feel free to contact my office if you have any questions or concerns. Thank you.  OTHER Outpatient EEG  Diagnosis: Seizure disorder  Results to Dr. Brent Blackman cholecalciferol, vitamin D3, (VITAMIN D3) 2,000 unit tab Take  by mouth. Indications: Vitamin D Deficiency    COMBIGAN 0.2-0.5 % drop ophthalmic solution     prednisoLONE acetate (PRED FORTE) 1 % ophthalmic suspension      No current facility-administered medications for this visit.               Review of Systems  Respiratory: negative for dyspnea on exertion  Cardiovascular: negative for chest pain    Objective:     Visit Vitals    /76 (BP 1 Location: Left arm, BP Patient Position: Sitting)    Pulse 76    Temp 98.9 °F (37.2 °C) (Oral)    Resp 20    Ht 5' 9\" (1.753 m)    Wt (!) 440 lb (199.6 kg)    SpO2 99%    BMI 64.98 kg/m2        Chest - clear to auscultation, no wheezes, rales or rhonchi, symmetric air entry  Heart - normal rate, regular rhythm, normal S1, S2, no murmurs, rubs, clicks or gallops  Extremities - pedal edema 3 + bilaterally       Labs:   Lab Results   Component Value Date/Time    Hemoglobin A1c 6.2 (H) 04/20/2018 02:52 PM    Hemoglobin A1c 6.4 (H) 10/23/2017 11:38 AM    Hemoglobin A1c 6.2 (H) 07/29/2017 12:41 AM    Glucose 83 04/20/2018 02:52 PM    Glucose (POC) 119 (H) 10/28/2017 06:29 AM    Microalb/Creat ratio (ug/mg creat.)  10/23/2017 11:38 AM      Comment:      ** Unable to calculate Microablumin/Creatinine Ratio due to low Microalbumin    LDL, calculated 179 (H) 04/20/2018 02:52 PM    Creatinine 1.0 04/20/2018 02:52 PM      Lab Results   Component Value Date/Time    Cholesterol, total 240 (H) 04/20/2018 02:52 PM    HDL Cholesterol 37 (L) 04/20/2018 02:52 PM    LDL, calculated 179 (H) 04/20/2018 02:52 PM    Triglyceride 121 04/20/2018 02:52 PM       Lab Results   Component Value Date/Time    ALT (SGPT) 10 04/20/2018 02:52 PM    AST (SGOT) 13 04/20/2018 02:52 PM    Alk. phosphatase 77 04/20/2018 02:52 PM    Bilirubin, direct 0.2 10/27/2017 10:50 PM    Bilirubin, total 0.5 04/20/2018 02:52 PM       Lab Results   Component Value Date/Time    GFR est AA >60 10/27/2017 10:50 PM    GFR est non-AA 52 (L) 10/27/2017 10:50 PM    Creatinine 1.0 04/20/2018 02:52 PM    BUN 20 04/20/2018 02:52 PM    Sodium 141 04/20/2018 02:52 PM    Potassium 4.0 04/20/2018 02:52 PM    Chloride 100 04/20/2018 02:52 PM    CO2 26 04/20/2018 02:52 PM            Assessment / Plan     preDiabetes well controlled  with diet  Hypertension well controlled, on Zestoretic   Hyperlipidemia poorly controlled, will try to improve with weight loss. ICD-10-CM ICD-9-CM    1. Uncontrolled type 2 diabetes mellitus without complication, without long-term current use of insulin ([facility]) E11.65 250.02 HEMOGLOBIN A1C W/O EAG      HEMOGLOBIN A1C W/O EAG   2. High cholesterol E78.00 272.0 LIPID PANEL      LIPID PANEL   3. Essential hypertension A73 302.4 METABOLIC PANEL, COMPREHENSIVE      METABOLIC PANEL, COMPREHENSIVE   4. Right foot pain M79.671 729.5 Pt uses indocin prn   5. Morbid obesity due to excess calories ([address] E66.01 278.01 Pt will continue to work on diet and exercise to lose weight. 6. Vitamin D deficiency E55.9 268.9 Uncontrolled.  Pt to use vit D 5000 units/day VITAMIN D, 25 HYDROXY      VITAMIN D, 25 HYDROXY              Diabetic issues reviewed with her: diabetic diet discussed in detail, and low cholesterol diet, weight control and daily exercise discussed. Follow-up Disposition:  Return in about 4 months (around 8/20/2018). Reviewed plan of care. Patient has provided input and agrees with goals.

## 2018-04-21 LAB
25(OH)D3 SERPL-MCNC: 13.6 NG/ML (ref 32–100)
A-G RATIO,AGRAT: 1.6 RATIO (ref 1.1–2.6)
ALBUMIN SERPL-MCNC: 4.5 G/DL (ref 3.5–5)
ALP SERPL-CCNC: 77 U/L (ref 25–115)
ALT SERPL-CCNC: 10 U/L (ref 5–40)
ANION GAP SERPL CALC-SCNC: 15 MMOL/L
AST SERPL W P-5'-P-CCNC: 13 U/L (ref 10–37)
AVG GLU, 10930: 131 MG/DL (ref 91–123)
BILIRUB SERPL-MCNC: 0.5 MG/DL (ref 0.2–1.2)
BUN SERPL-MCNC: 20 MG/DL (ref 6–22)
CALCIUM SERPL-MCNC: 9.7 MG/DL (ref 8.4–10.5)
CHLORIDE SERPL-SCNC: 100 MMOL/L (ref 98–110)
CHOLEST SERPL-MCNC: 240 MG/DL (ref 110–200)
CO2 SERPL-SCNC: 26 MMOL/L (ref 20–32)
CREAT SERPL-MCNC: 1 MG/DL (ref 0.5–1.2)
GFRAA, 66117: >60
GFRNA, 66118: 56.6
GLOBULIN,GLOB: 2.9 G/DL (ref 2–4)
GLUCOSE SERPL-MCNC: 83 MG/DL (ref 70–99)
HBA1C MFR BLD HPLC: 6.2 % (ref 4.8–5.9)
HDLC SERPL-MCNC: 37 MG/DL (ref 40–59)
HDLC SERPL-MCNC: 6.5 MG/DL (ref 0–5)
LDLC SERPL CALC-MCNC: 179 MG/DL (ref 50–99)
POTASSIUM SERPL-SCNC: 4 MMOL/L (ref 3.5–5.5)
PROT SERPL-MCNC: 7.4 G/DL (ref 6.4–8.3)
SODIUM SERPL-SCNC: 141 MMOL/L (ref 133–145)
TRIGL SERPL-MCNC: 121 MG/DL (ref 40–149)
VLDLC SERPL CALC-MCNC: 24 MG/DL (ref 8–30)

## 2018-04-24 NOTE — PROGRESS NOTES
Patient is aware:Tell patient his/her vitamin D level is low. She should take vitamin D 2000 units/day. Her cholesterol remains high. Continue to work on diet and weight loss. Patient verbalizes understanding.

## 2018-04-24 NOTE — PROGRESS NOTES
Tell patient his/her vitamin D level is low. She should take vitamin D 2000 units/day. Her cholesterol remains high.  Continue to work on diet and weight loss

## 2018-06-04 RX ORDER — INDOMETHACIN 50 MG/1
CAPSULE ORAL
Qty: 60 CAP | Refills: 0 | Status: SHIPPED | OUTPATIENT
Start: 2018-06-04 | End: 2018-09-06 | Stop reason: SDUPTHER

## 2018-06-26 RX ORDER — POTASSIUM CHLORIDE 20 MEQ/1
20 TABLET, EXTENDED RELEASE ORAL 2 TIMES DAILY
Qty: 60 TAB | Refills: 3 | Status: SHIPPED | OUTPATIENT
Start: 2018-06-26 | End: 2019-12-27

## 2018-07-19 RX ORDER — IBUPROFEN 800 MG/1
TABLET ORAL
Qty: 90 TAB | Refills: 2 | Status: SHIPPED | OUTPATIENT
Start: 2018-07-19 | End: 2018-12-12 | Stop reason: SDUPTHER

## 2018-08-18 DIAGNOSIS — I10 ESSENTIAL HYPERTENSION WITH GOAL BLOOD PRESSURE LESS THAN 140/90: ICD-10-CM

## 2018-08-19 RX ORDER — LISINOPRIL AND HYDROCHLOROTHIAZIDE 20; 25 MG/1; MG/1
TABLET ORAL
Qty: 30 TAB | Refills: 5 | Status: SHIPPED | OUTPATIENT
Start: 2018-08-19 | End: 2019-02-23 | Stop reason: SDUPTHER

## 2018-09-07 RX ORDER — INDOMETHACIN 50 MG/1
CAPSULE ORAL
Qty: 60 CAP | Refills: 0 | Status: SHIPPED | OUTPATIENT
Start: 2018-09-07 | End: 2018-10-04 | Stop reason: SDUPTHER

## 2018-10-19 RX ORDER — FUROSEMIDE 40 MG/1
TABLET ORAL
Qty: 30 TAB | Refills: 5 | Status: SHIPPED | OUTPATIENT
Start: 2018-10-19 | End: 2019-03-12 | Stop reason: SDUPTHER

## 2018-10-29 ENCOUNTER — TELEPHONE (OUTPATIENT)
Dept: NEUROLOGY | Age: 48
End: 2018-10-29

## 2018-10-29 NOTE — TELEPHONE ENCOUNTER
Pt called to schedule f/u appt for 11/2 at 9:40am. Pt is a  and  complains of extreme pain and muscle spasms since taking KEPPRA. Pt asks if there is replacement medication she could take. Please advise.

## 2018-10-31 NOTE — TELEPHONE ENCOUNTER
Ramila Wilde MD       Stop the drug on Nov 1 and we'll decide on a new option at follow up (Routing comment)        Spoke with Ms. Peres and informed her of Dr. Ramila Wilde instructions. She verbalized understanding.

## 2018-11-02 ENCOUNTER — TELEPHONE (OUTPATIENT)
Dept: NEUROLOGY | Age: 48
End: 2018-11-02

## 2018-11-02 ENCOUNTER — OFFICE VISIT (OUTPATIENT)
Dept: NEUROLOGY | Age: 48
End: 2018-11-02

## 2018-11-02 VITALS
BODY MASS INDEX: 64.98 KG/M2 | RESPIRATION RATE: 16 BRPM | HEART RATE: 91 BPM | TEMPERATURE: 98.9 F | SYSTOLIC BLOOD PRESSURE: 138 MMHG | HEIGHT: 69 IN | OXYGEN SATURATION: 97 % | DIASTOLIC BLOOD PRESSURE: 78 MMHG

## 2018-11-02 DIAGNOSIS — M48.062 SPINAL STENOSIS OF LUMBAR REGION WITH NEUROGENIC CLAUDICATION: Primary | ICD-10-CM

## 2018-11-02 RX ORDER — CYCLOBENZAPRINE HCL 5 MG
5 TABLET ORAL
Qty: 90 TAB | Refills: 2 | Status: SHIPPED | OUTPATIENT
Start: 2018-11-02 | End: 2018-12-19 | Stop reason: SDUPTHER

## 2018-11-02 NOTE — PROGRESS NOTES
Re:  Jenny Ruiz up visit     11/2/2018 10:07 AM 
 
SSN: [id number] Subjective:   Guillermina Marie returns for follow up. She was seen in the hospital for seizure activity back in October 27, 2017. Prior to that she had several episodes of altered consciousness and possible seizure activity. This was the first spell of definitive seizure activity. She's had no spells since being on the Keppra. No seizures in the last year. She does complain of hair lose since being on the [employer]. Her new complaint is pain and spasms of the back and thighs. She feels pain in the thighs at night as well as spasms of the thighs at night. This started about 2 months ago. No trauma recently. She's having significant trouble walking any distances. It's worse in the rain and cold. No overt weakness, just pain. Standing for a while hurts. Sitting relieves the pain. Medications:   
Current Outpatient Medications Medication Sig Dispense Refill  furosemide (LASIX) 40 mg tablet take 1 tablet by mouth once daily 30 Tab 5  
 indomethacin (INDOCIN) 50 mg capsule take 1 capsule by mouth three times a day AFTER MEALS 60 Cap 2  
 lisinopril-hydroCHLOROthiazide (PRINZIDE, ZESTORETIC) 20-25 mg per tablet take 1 tablet by mouth once daily 30 Tab 5  ibuprofen (MOTRIN) 800 mg tablet take 1 tablet by mouth every 8 hours if needed for pain 90 Tab 2  potassium chloride (K-DUR, KLOR-CON) 20 mEq tablet Take 1 Tab by mouth two (2) times a day. 60 Tab 3  
 levETIRAcetam (KEPPRA) 1,000 mg tablet Take 1 Tab by mouth two (2) times a day. Indications: TONIC-CLONIC EPILEPSY TREATMENT ADJUNCT 60 Tab 11  
 cholecalciferol, vitamin D3, (VITAMIN D3) 2,000 unit tab Take  by mouth. Indications: Vitamin D Deficiency  COMBIGAN 0.2-0.5 % drop ophthalmic solution   0  
 prednisoLONE acetate (PRED FORTE) 1 % ophthalmic suspension   0  
 OTHER EEG Dx: suspected seizure disorder Fax results to Dr. Deya Malone and Dr. Cuhng Martínez 1 Each 0  
 OTHER EEG Dx: Suspected seizure disorder Fax results to Dr. Deya Malone and Dr. Chung Martínez 1 Each 0  
 OTHER This is to certify that Britney Blas was admitted to [facility] from 10/27/2017 to 10/28/2017. She may may return to work on 11/06/2017. Please feel free to contact my office if you have any questions or concerns. Thank you. 1 Each 0  
 OTHER Outpatient EEG Diagnosis: Seizure disorder Results to Dr. Chung Martínez 1 Each 0 Vital signs:   
Visit Vitals /78 (BP 1 Location: Left arm, BP Patient Position: Sitting) Pulse 91 Temp 98.9 °F (37.2 °C) (Oral) Resp 16 Ht 5' 9\" (1.753 m) SpO2 97% BMI 64.98 kg/m² Review of Systems: As above otherwise 11 point review of systems negative including;  
Constitutional no fever or chills Skin denies rash or itching HEENT  Denies tinnitus, hearing lose Eyes denies diplopia vision lose Respiratory denies sortness of breath Cardiovascular denies chest pain, dyspnea on exertion Gastrointestinal denies nausea, vomiting, diarrhea, constipation Genitourinary denies incontinence Musculoskeletal denies joint pain or swelling Endocrine denies weight change Hematology denies easy bruising or bleeding Neurological as above in HPI Patient Active Problem List  
Diagnosis Code  Sarcoidosis D86.9  Borderline diabetes R73.03  
 Glaucoma H40.9  Morbid obesity due to excess calories (HCC) E66.01  
 High cholesterol E78.00  DM (diabetes mellitus), type 2, uncontrolled ([address] E11.65  Seizure ([address] R56.9  Seizures ([address] R56.9  Vitamin D deficiency E55.9 Objective: The patient is awake, alert, and oriented x 4. Fund of knowledge is adequate. Speech is fluent and memory is intact. Cranial Nerves: II  Visual fields are full to confrontation. III, IV, VI  Extraocular movements are intact. There is no nystagmus.  V  Facial sensation is intact to pinprick. VII  Face is symmetrical.  VIII - Hearing is present. IX, X, XII  Palate is symmetrical.   XI - Shoulder shrugging and head turning intact Motor: The patient moves all four limbs fairly well and symmetrically. Tone is normal. Reflexes are 2+ and symmetrical. Plantars are down going. Gait is antalgic, bent over. CBC:  
Lab Results Component Value Date/Time WBC 8.3 10/27/2017 10:50 PM  
 RBC 4.13 (L) 10/27/2017 10:50 PM  
 HGB 12.1 10/27/2017 10:50 PM  
 HCT 36.3 10/27/2017 10:50 PM  
 PLATELET 303 52/45/7508 10:50 PM  
 
BMP:  
Lab Results Component Value Date/Time Glucose 83 04/20/2018 02:52 PM  
 Sodium 141 04/20/2018 02:52 PM  
 Potassium 4.0 04/20/2018 02:52 PM  
 Chloride 100 04/20/2018 02:52 PM  
 CO2 26 04/20/2018 02:52 PM  
 BUN 20 04/20/2018 02:52 PM  
 Creatinine 1.0 04/20/2018 02:52 PM  
 Calcium 9.7 04/20/2018 02:52 PM  
 
CMP:  
Lab Results Component Value Date/Time Glucose 83 04/20/2018 02:52 PM  
 Sodium 141 04/20/2018 02:52 PM  
 Potassium 4.0 04/20/2018 02:52 PM  
 Chloride 100 04/20/2018 02:52 PM  
 CO2 26 04/20/2018 02:52 PM  
 BUN 20 04/20/2018 02:52 PM  
 Creatinine 1.0 04/20/2018 02:52 PM  
 Calcium 9.7 04/20/2018 02:52 PM  
 Anion gap 15.0 04/20/2018 02:52 PM  
 BUN/Creatinine ratio 20 10/27/2017 10:50 PM  
 Alk. phosphatase 77 04/20/2018 02:52 PM  
 Protein, total 7.4 04/20/2018 02:52 PM  
 Albumin 4.5 04/20/2018 02:52 PM  
 Globulin 2.9 04/20/2018 02:52 PM  
 A-G Ratio 1.6 04/20/2018 02:52 PM  
 
Coagulation: No results found for: PTP, INR, APTT, PTTT Cardiac markers:  
Lab Results Component Value Date/Time CK 81 10/27/2017 10:50 PM  
 CK-MB Index  10/27/2017 10:50 PM  
  CALCULATION NOT PERFORMED WHEN RESULT IS BELOW LINEAR LIMIT Assessment:  New onset seizure activity who has risk factors including none. She's doing well currently on the [employer].   New back and leg pain especially when she's standing. Suspect she's suffering from spinal stenosis. Plan:  Continue current medication and return here in 6 weeks. Will get a plain x-ray of the back to start. Will add some Flexeril for spasm. Sincerely, 
 
 
 
Geo Bhat.  Paula Bates M.D.

## 2018-11-02 NOTE — PROGRESS NOTES
Chief Complaint Patient presents with  Follow-up  Seizure 1. Have you been to the ER, urgent care clinic since your last visit? Hospitalized since your last visit? No 
 
2. Have you seen or consulted any other health care providers outside of the [address] [place] [address] since your last visit? Include any pap smears or colon screening. No 
 
PHQ over the last two weeks 11/2/2018 PHQ Not Done - Little interest or pleasure in doing things Several days Feeling down, depressed, irritable, or hopeless Several days Total Score PHQ 2 2

## 2018-11-02 NOTE — TELEPHONE ENCOUNTER
Pt would like to know if Dr. Mauri Carrera wants her to begin taking Keppra again? States that she was advised to discontinue the other day based on the pain she was experiencing.  Please advise

## 2018-11-02 NOTE — TELEPHONE ENCOUNTER
Viv Haq MD          She should continue taking the [address] as previously ordered. Spoke with Ms. Llanoss and informed her of Dr. Viv Haq response. She verbalized understanding and states she will restart her Keppra tonight.

## 2018-11-24 DIAGNOSIS — R56.9 SEIZURE (HCC): ICD-10-CM

## 2018-11-26 RX ORDER — LEVETIRACETAM 1000 MG/1
TABLET ORAL
Qty: 60 TAB | Refills: 11 | Status: SHIPPED | OUTPATIENT
Start: 2018-11-26 | End: 2019-03-11 | Stop reason: SDUPTHER

## 2018-12-13 ENCOUNTER — DOCUMENTATION ONLY (OUTPATIENT)
Dept: NEUROLOGY | Age: 48
End: 2018-12-13

## 2018-12-13 NOTE — PROGRESS NOTES
Called, left voicemail for patient to call. She still needs to have xr spine lumbar completed before returning for follow-up visit.

## 2018-12-17 ENCOUNTER — HOSPITAL ENCOUNTER (OUTPATIENT)
Dept: GENERAL RADIOLOGY | Age: 48
Discharge: HOME | End: 2018-12-17
Payer: COMMERCIAL

## 2018-12-17 DIAGNOSIS — M48.062 SPINAL STENOSIS OF LUMBAR REGION WITH NEUROGENIC CLAUDICATION: ICD-10-CM

## 2018-12-17 PROCEDURE — 72110 X-RAY EXAM L-2 SPINE 4/>VWS: CPT

## 2018-12-19 ENCOUNTER — OFFICE VISIT (OUTPATIENT)
Dept: NEUROLOGY | Age: 48
End: 2018-12-19

## 2018-12-19 VITALS
TEMPERATURE: 97.8 F | RESPIRATION RATE: 22 BRPM | SYSTOLIC BLOOD PRESSURE: 114 MMHG | WEIGHT: 293 LBS | BODY MASS INDEX: 43.4 KG/M2 | OXYGEN SATURATION: 98 % | HEIGHT: 69 IN | DIASTOLIC BLOOD PRESSURE: 78 MMHG | HEART RATE: 87 BPM

## 2018-12-19 DIAGNOSIS — R56.9 SEIZURE (HCC): Primary | ICD-10-CM

## 2018-12-19 DIAGNOSIS — M48.062 SPINAL STENOSIS OF LUMBAR REGION WITH NEUROGENIC CLAUDICATION: ICD-10-CM

## 2018-12-19 RX ORDER — CYCLOBENZAPRINE HCL 5 MG
5 TABLET ORAL
Qty: 90 TAB | Refills: 2 | Status: SHIPPED | OUTPATIENT
Start: 2018-12-19

## 2018-12-19 NOTE — LETTER
12/19/2018 9:54 AM 
 
Patient:  Michelle Turner YOB: 1970 Date of Visit: 12/19/2018 Dear Everett Gottron, MD 
[address] [facility] [id number] VIA In Basket 
 : Thank you for referring Ms. Robbin Conti to me for evaluation/treatment. Below are the relevant portions of my assessment and plan of care. If you have questions, please do not hesitate to call me. I look forward to following Ms. Peres along with you.  
 
 
 
Sincerely, 
 
 
Maty Nava MD

## 2018-12-19 NOTE — PROGRESS NOTES
Nadya Jang is a 50 y.o. female in today for follow-up on spinal stenosis, seizures and x-ray results. Learning assessment previously completed; primary language is [place]. 1. Have you been to the ER, urgent care clinic since your last visit? Hospitalized since your last visit? No    2. Have you seen or consulted any other health care providers outside of the [address] [place] [address] since your last visit? Include any pap smears or colon screening.  No

## 2018-12-19 NOTE — PROGRESS NOTES
Re:  Alex Jurado up visit     12/19/2018 10:07 AM    SSN: [id number]    Subjective:   Ayanna Naqvi returns for follow up. She was seen in the hospital for seizure activity back in October 27, 2017. Prior to that she had several episodes of altered consciousness and possible seizure activity. This was the first spell of definitive seizure activity. She's had no spells since being on the Keppra. No seizures in the last 18 months. She does complain of hair lose since being on the [address]. Her new complaint is pain and spasms of the back and thighs. She feels pain in the thighs at night as well as spasms of the thighs at night. This started about 3 months ago. No trauma recently. She's having significant trouble walking any distances. It's worse in the rain and cold. No overt weakness, just pain. Standing for a while hurts. Sitting relieves the pain. She has gotten significant relief of her symptoms with Flexeril. Medications:    Current Outpatient Medications   Medication Sig Dispense Refill    levETIRAcetam 1,000 mg tablet take 1 tablet by mouth twice a day 60 Tab 11    cyclobenzaprine (FLEXERIL) 5 mg tablet Take 1 Tab by mouth three (3) times daily as needed for Muscle Spasm(s). 90 Tab 2    furosemide (LASIX) 40 mg tablet take 1 tablet by mouth once daily 30 Tab 5    indomethacin (INDOCIN) 50 mg capsule take 1 capsule by mouth three times a day AFTER MEALS 60 Cap 2    lisinopril-hydroCHLOROthiazide (PRINZIDE, ZESTORETIC) 20-25 mg per tablet take 1 tablet by mouth once daily 30 Tab 5    potassium chloride (K-DUR, KLOR-CON) 20 mEq tablet Take 1 Tab by mouth two (2) times a day.  60 Tab 3    ibuprofen (MOTRIN) 800 mg tablet take 1 tablet by mouth every 8 hours if needed for pain 90 Tab 0    OTHER EEG  Dx: suspected seizure disorder  Fax results to Dr. Angie Collazo and Dr. Cortes Kinds 1 Each 0    OTHER EEG  Dx: Suspected seizure disorder  Fax results to Dr. Angie Collazo and  Missy 1 Each 0    OTHER This is to certify that Mercedes Malone was admitted to [facility] from 10/27/2017 to 10/28/2017. She may may return to work on 11/06/2017. Please feel free to contact my office if you have any questions or concerns. Thank you. 1 Each 0    OTHER Outpatient EEG  Diagnosis: Seizure disorder  Results to Dr. Sarah Thomas 1 Each 0    cholecalciferol, vitamin D3, (VITAMIN D3) 2,000 unit tab Take  by mouth. Indications: Vitamin D Deficiency      COMBIGAN 0.2-0.5 % drop ophthalmic solution   0    prednisoLONE acetate (PRED FORTE) 1 % ophthalmic suspension   0       Vital signs:    Visit Vitals  /78 (BP 1 Location: Left arm, BP Patient Position: Sitting)   Pulse 87   Temp 97.8 °F (36.6 °C) (Oral)   Resp 22   Ht 5' 9\" (1.753 m)   Wt (!) 172.4 kg (380 lb) Comment: declined, pt stated   SpO2 98%   BMI 56.12 kg/m²       Review of Systems:   As above otherwise 11 point review of systems negative including;   Constitutional no fever or chills  Skin denies rash or itching  HEENT  Denies tinnitus, hearing lose  Eyes denies diplopia vision lose  Respiratory denies sortness of breath  Cardiovascular denies chest pain, dyspnea on exertion  Gastrointestinal denies nausea, vomiting, diarrhea, constipation  Genitourinary denies incontinence  Musculoskeletal denies joint pain or swelling  Endocrine denies weight change  Hematology denies easy bruising or bleeding   Neurological as above in HPI      Patient Active Problem List   Diagnosis Code    Sarcoidosis D86.9    Borderline diabetes R73.03    Glaucoma H40.9    Morbid obesity due to excess calories (HCC) E66.01    High cholesterol E78.00    DM (diabetes mellitus), type 2, uncontrolled ([address] E11.65    Seizure ([address] R56.9    Seizures ([address] R56.9    Vitamin D deficiency E55.9         Objective: The patient is awake, alert, and oriented x 4. Fund of knowledge is adequate. Speech is fluent and memory is intact.   Cranial Nerves: II - Visual fields are full to confrontation. III, IV, VI - Extraocular movements are intact. There is no nystagmus. V - Facial sensation is intact to pinprick. VII - Face is symmetrical.  VIII - Hearing is present. IX, X, XII - Palate is symmetrical.   XI - Shoulder shrugging and head turning intact  Motor: The patient moves all four limbs fairly well and symmetrically. Tone is normal. Reflexes are 2+ and symmetrical. Plantars are down going. Gait is antalgic, bent over, but better than last time. CBC:   Lab Results   Component Value Date/Time    WBC 8.3 10/27/2017 10:50 PM    RBC 4.13 (L) 10/27/2017 10:50 PM    HGB 12.1 10/27/2017 10:50 PM    HCT 36.3 10/27/2017 10:50 PM    PLATELET 812 21/99/0447 10:50 PM     BMP:   Lab Results   Component Value Date/Time    Glucose 83 04/20/2018 02:52 PM    Sodium 141 04/20/2018 02:52 PM    Potassium 4.0 04/20/2018 02:52 PM    Chloride 100 04/20/2018 02:52 PM    CO2 26 04/20/2018 02:52 PM    BUN 20 04/20/2018 02:52 PM    Creatinine 1.0 04/20/2018 02:52 PM    Calcium 9.7 04/20/2018 02:52 PM     CMP:   Lab Results   Component Value Date/Time    Glucose 83 04/20/2018 02:52 PM    Sodium 141 04/20/2018 02:52 PM    Potassium 4.0 04/20/2018 02:52 PM    Chloride 100 04/20/2018 02:52 PM    CO2 26 04/20/2018 02:52 PM    BUN 20 04/20/2018 02:52 PM    Creatinine 1.0 04/20/2018 02:52 PM    Calcium 9.7 04/20/2018 02:52 PM    Anion gap 15.0 04/20/2018 02:52 PM    BUN/Creatinine ratio 20 10/27/2017 10:50 PM    Alk.  phosphatase 77 04/20/2018 02:52 PM    Protein, total 7.4 04/20/2018 02:52 PM    Albumin 4.5 04/20/2018 02:52 PM    Globulin 2.9 04/20/2018 02:52 PM    A-G Ratio 1.6 04/20/2018 02:52 PM     Coagulation: No results found for: PTP, INR, APTT, PTTT  Cardiac markers:   Lab Results   Component Value Date/Time    CK 81 10/27/2017 10:50 PM    CK-MB Index  10/27/2017 10:50 PM     CALCULATION NOT PERFORMED WHEN RESULT IS BELOW LINEAR LIMIT     Final result (Exam End: 12/17/2018 15:36) Provider Status: Reviewed   Result Notes for XR SPINE LUMB MIN 4 V     Notes recorded by Anselmo Villafuerte LPN on 51/83/7357 at 3:32 PM EST  Spoke with patient, made aware of provider's comments.          Study Result     EXAM:  XR SPINE LUMB MIN 4 V     INDICATION:   spinal stenosis     COMPARISON: None.     FINDINGS: AP, lateral and spot lateral views of the lumbar spine were obtained.     Frontal view show no scoliosis. SI joints appeared normal.     Lateral view showed moderate degenerative disc disease in the lower thoracic  spine. There is vacuum disc phenomenon at L4-5 and L5-S1 due to disc  degeneration. L5-S1 demonstrated prominent disc marginal osteophytes.     There is facet hypertrophy at L4-5 and L5-S1.     Oblique views do not demonstrate any pars interarticularis defects.     IMPRESSION  IMPRESSION:   1. Significant degenerative disc disease at L4-5 and L5-S1 with vacuum disc  phenomenon. 2. Moderate L4-5, L5-S1 facet hypertrophy. 3. No evidence for pars interarticularis defects. 4. No compression fractures.                 Assessment:  New onset seizure activity who has risk factors including none. She's doing well currently on the [address]. New back and leg pain especially when she's standing. She's suffering from spinal stenosis. Plan:  Continue current medication and return here in 3 months. Sincerely,        Toribio Francois.  Tammy Lilly M.D.

## 2019-02-25 ENCOUNTER — TELEPHONE (OUTPATIENT)
Dept: FAMILY MEDICINE CLINIC | Age: 49
End: 2019-02-25

## 2019-03-11 DIAGNOSIS — R56.9 SEIZURE (HCC): ICD-10-CM

## 2019-03-11 RX ORDER — LEVETIRACETAM 1000 MG/1
TABLET ORAL
Qty: 60 TAB | Refills: 11 | Status: SHIPPED | OUTPATIENT
Start: 2019-03-11 | End: 2019-03-14 | Stop reason: SDUPTHER

## 2019-03-11 NOTE — TELEPHONE ENCOUNTER
Requested Prescriptions     Pending Prescriptions Disp Refills    levETIRAcetam 1,000 mg tablet 60 Tab 11     Faxed script scanned to chart

## 2019-03-12 ENCOUNTER — DOCUMENTATION ONLY (OUTPATIENT)
Dept: NEUROLOGY | Age: 49
End: 2019-03-12

## 2019-03-12 DIAGNOSIS — I10 ESSENTIAL HYPERTENSION WITH GOAL BLOOD PRESSURE LESS THAN 140/90: ICD-10-CM

## 2019-03-12 RX ORDER — FUROSEMIDE 40 MG/1
TABLET ORAL
Qty: 90 TAB | Refills: 0 | Status: SHIPPED | OUTPATIENT
Start: 2019-03-12 | End: 2019-03-13 | Stop reason: SDUPTHER

## 2019-03-12 RX ORDER — IBUPROFEN 800 MG/1
TABLET ORAL
Qty: 90 TAB | Refills: 0 | Status: SHIPPED | OUTPATIENT
Start: 2019-03-12 | End: 2019-07-16 | Stop reason: SDUPTHER

## 2019-03-12 RX ORDER — LISINOPRIL AND HYDROCHLOROTHIAZIDE 20; 25 MG/1; MG/1
TABLET ORAL
Qty: 90 TAB | Refills: 0 | Status: SHIPPED | OUTPATIENT
Start: 2019-03-12 | End: 2019-03-13 | Stop reason: SDUPTHER

## 2019-03-13 RX ORDER — LISINOPRIL AND HYDROCHLOROTHIAZIDE 20; 25 MG/1; MG/1
1 TABLET ORAL DAILY
Qty: 90 TAB | Refills: 0 | Status: SHIPPED | OUTPATIENT
Start: 2019-03-13 | End: 2019-03-26 | Stop reason: SDUPTHER

## 2019-03-13 RX ORDER — FUROSEMIDE 40 MG/1
TABLET ORAL
Qty: 90 TAB | Refills: 0 | Status: SHIPPED | OUTPATIENT
Start: 2019-03-13 | End: 2019-03-26 | Stop reason: SDUPTHER

## 2019-03-14 DIAGNOSIS — R56.9 SEIZURE (HCC): ICD-10-CM

## 2019-03-14 RX ORDER — LEVETIRACETAM 1000 MG/1
TABLET ORAL
Qty: 60 TAB | Refills: 11 | Status: SHIPPED | OUTPATIENT
Start: 2019-03-14 | End: 2019-04-16

## 2019-03-14 NOTE — TELEPHONE ENCOUNTER
Requested Prescriptions     Pending Prescriptions Disp Refills    levETIRAcetam 1,000 mg tablet 60 Tab 11     Sig: take 1 tablet by mouth twice a day       Last Filled: 3/11/19    Requesting for different pharmacy.

## 2019-03-14 NOTE — TELEPHONE ENCOUNTER
Requested Prescriptions     Pending Prescriptions Disp Refills    levETIRAcetam 1,000 mg tablet 60 Tab 11     Sig: take 1 tablet by mouth twice a day

## 2019-03-26 ENCOUNTER — OFFICE VISIT (OUTPATIENT)
Dept: FAMILY MEDICINE CLINIC | Age: 49
End: 2019-03-26

## 2019-03-26 VITALS
HEIGHT: 69 IN | BODY MASS INDEX: 43.4 KG/M2 | DIASTOLIC BLOOD PRESSURE: 80 MMHG | SYSTOLIC BLOOD PRESSURE: 147 MMHG | WEIGHT: 293 LBS | HEART RATE: 79 BPM | RESPIRATION RATE: 20 BRPM | OXYGEN SATURATION: 95 % | TEMPERATURE: 98.7 F

## 2019-03-26 DIAGNOSIS — E66.01 MORBID OBESITY DUE TO EXCESS CALORIES (HCC): ICD-10-CM

## 2019-03-26 DIAGNOSIS — E78.00 HIGH CHOLESTEROL: ICD-10-CM

## 2019-03-26 DIAGNOSIS — M79.671 RIGHT FOOT PAIN: ICD-10-CM

## 2019-03-26 DIAGNOSIS — R56.9 SEIZURE (HCC): ICD-10-CM

## 2019-03-26 DIAGNOSIS — I10 ESSENTIAL HYPERTENSION WITH GOAL BLOOD PRESSURE LESS THAN 140/90: Primary | ICD-10-CM

## 2019-03-26 DIAGNOSIS — R73.03 PREDIABETES: ICD-10-CM

## 2019-03-26 DIAGNOSIS — E55.9 VITAMIN D DEFICIENCY: ICD-10-CM

## 2019-03-26 RX ORDER — LISINOPRIL AND HYDROCHLOROTHIAZIDE 20; 25 MG/1; MG/1
1 TABLET ORAL DAILY
Qty: 90 TAB | Refills: 3 | Status: SHIPPED | OUTPATIENT
Start: 2019-03-26 | End: 2019-04-11 | Stop reason: SDUPTHER

## 2019-03-26 RX ORDER — FUROSEMIDE 40 MG/1
TABLET ORAL
Qty: 90 TAB | Refills: 3 | Status: SHIPPED | OUTPATIENT
Start: 2019-03-26 | End: 2019-11-04 | Stop reason: SDUPTHER

## 2019-03-26 NOTE — PROGRESS NOTES
Patient is in the office today for medication refill. 1. Have you been to the ER, urgent care clinic since your last visit? Hospitalized since your last visit? No 
 
2. Have you seen or consulted any other health care providers outside of the [address] [place] [address] since your last visit? Include any pap smears or colon screening.  No

## 2019-03-27 LAB
25(OH)D3+25(OH)D2 SERPL-MCNC: 10.1 NG/ML (ref 30–100)
ALBUMIN SERPL-MCNC: 4.2 G/DL (ref 3.5–5.5)
ALBUMIN/GLOB SERPL: 1.3 {RATIO} (ref 1.2–2.2)
ALP SERPL-CCNC: 80 IU/L (ref 39–117)
ALT SERPL-CCNC: 9 IU/L (ref 0–32)
AST SERPL-CCNC: 10 IU/L (ref 0–40)
BILIRUB SERPL-MCNC: 0.4 MG/DL (ref 0–1.2)
BUN SERPL-MCNC: 19 MG/DL (ref 6–24)
BUN/CREAT SERPL: 16 (ref 9–23)
CALCIUM SERPL-MCNC: 9.6 MG/DL (ref 8.7–10.2)
CHLORIDE SERPL-SCNC: 97 MMOL/L (ref 96–106)
CHOLEST SERPL-MCNC: 254 MG/DL (ref 100–199)
CO2 SERPL-SCNC: 23 MMOL/L (ref 20–29)
CREAT SERPL-MCNC: 1.18 MG/DL (ref 0.57–1)
GLOBULIN SER CALC-MCNC: 3.2 G/DL (ref 1.5–4.5)
GLUCOSE SERPL-MCNC: 87 MG/DL (ref 65–99)
HBA1C MFR BLD: 6.4 % (ref 4.8–5.6)
HDLC SERPL-MCNC: 42 MG/DL
LDLC SERPL CALC-MCNC: 187 MG/DL (ref 0–99)
POTASSIUM SERPL-SCNC: 3.7 MMOL/L (ref 3.5–5.2)
PROT SERPL-MCNC: 7.4 G/DL (ref 6–8.5)
SODIUM SERPL-SCNC: 139 MMOL/L (ref 134–144)
TRIGL SERPL-MCNC: 123 MG/DL (ref 0–149)
URATE SERPL-MCNC: 11 MG/DL (ref 2.5–7.1)
VLDLC SERPL CALC-MCNC: 25 MG/DL (ref 5–40)

## 2019-03-28 NOTE — PROGRESS NOTES
Subjective: Chief Complaint The patient presents for follow up of diabetes, hypertension and high cholesterol. HPI Wily Winters is a 52 y.o. female seen for follow up of diabetes. Shealso has hypertension and hyperlipidemia. Diabetes  controlled, with diet, pt continues to work on losing weight to improve further, hypertension borderline controlled, no significant medication side effects noted, on Zestoretic, hyperlipidemia poorly controlled, pt had syncope on multiple statins including Zocor and Pravachol. Would not try more statins with this pt. Pt has LDL~ 190 but did not qualify for Repatha in the past. Will try again if LDL not improving with weight loss. Diet and Lifestyle: generally follows a low fat low cholesterol diet, follows a diabetic diet regularly, exercises sporadically Home BP Monitoring: is borderline controlled. Pt will monitor more often. Diabetic Review of Systems - home glucose monitoring: is not performed. Other symptoms and concerns: pt has significant medical problems due to her morbid obesity including severe edema and lymphedema in her lower extremities that cuases pain and difficulty with pt walking and standing for more than 15 minutes. Pt uses Lasix prn. Pt also has h/o of sarcoidosis that contributes to shortness of breath along with her morbid obesity. Pt therefore gets very short of breath with walking even 1 block. Pt is currently being seen by neurology for seizures and is down to Keppra 1 gm/day Pt should be taking vit D 5000 units/day for her vit D deficiency. Her levels are low due to poor compliance. Current Outpatient Medications Medication Sig  
 lisinopril-hydroCHLOROthiazide (PRINZIDE, ZESTORETIC) 20-25 mg per tablet Take 1 Tab by mouth daily.  furosemide (LASIX) 40 mg tablet take 1 tablet by mouth once daily  levETIRAcetam 1,000 mg tablet take 1 tablet by mouth twice a day  ibuprofen (MOTRIN) 800 mg tablet take 1 tablet by mouth every 8 hours if needed for pain  cyclobenzaprine (FLEXERIL) 5 mg tablet Take 1 Tab by mouth three (3) times daily as needed for Muscle Spasm(s).  potassium chloride (K-DUR, KLOR-CON) 20 mEq tablet Take 1 Tab by mouth two (2) times a day.  OTHER EEG Dx: Suspected seizure disorder Fax results to Dr. Glenn Fernandez and Dr. Destini Valerio  OTHER This is to certify that Peter Agrawal was admitted to [facility] from 10/27/2017 to 10/28/2017. She may may return to work on 11/06/2017. Please feel free to contact my office if you have any questions or concerns. Thank you.  OTHER Outpatient EEG Diagnosis: Seizure disorder Results to Dr. Destini Valerio  cholecalciferol, vitamin D3, (VITAMIN D3) 2,000 unit tab Take  by mouth. Indications: Vitamin D Deficiency  COMBIGAN 0.2-0.5 % drop ophthalmic solution  prednisoLONE acetate (PRED FORTE) 1 % ophthalmic suspension  OTHER EEG Dx: suspected seizure disorder Fax results to Dr. Glenn Fernandez and Dr. Destini Valerio No current facility-administered medications for this visit. Review of Systems Respiratory: negative for dyspnea on exertion Cardiovascular: negative for chest pain Objective:  
 
Visit Vitals /80 (BP 1 Location: Left arm, BP Patient Position: Sitting) Pulse 79 Temp 98.7 °F (37.1 °C) (Oral) Resp 20 Ht 5' 9\" (1.753 m) Wt (!) 380 lb (172.4 kg) Comment: previus weight SpO2 95% BMI 56.12 kg/m² Chest - clear to auscultation, no wheezes, rales or rhonchi, symmetric air entry Heart - normal rate, regular rhythm, normal S1, S2, no murmurs, rubs, clicks or gallops Extremities - pedal edema 2 + bilaterally Labs:  
Lab Results Component Value Date/Time  Hemoglobin A1c 6.4 (H) 03/26/2019 03:30 AM  
 Hemoglobin A1c 6.2 (H) 04/20/2018 02:52 PM  
 Hemoglobin A1c 6.4 (H) 10/23/2017 11:38 AM  
 Glucose 87 03/26/2019 03:30 AM  
 Glucose (POC) 119 (H) 10/28/2017 06:29 AM  
 Microalb/Creat ratio (ug/mg creat.)  10/23/2017 11:38 AM  
   Comment:  
   ** Unable to calculate Microablumin/Creatinine Ratio due to low Microalbumin LDL, calculated 187 (H) 03/26/2019 03:30 AM  
 Creatinine 1.18 (H) 03/26/2019 03:30 AM  
  
Lab Results Component Value Date/Time Cholesterol, total 254 (H) 03/26/2019 03:30 AM  
 HDL Cholesterol 42 03/26/2019 03:30 AM  
 LDL, calculated 187 (H) 03/26/2019 03:30 AM  
 Triglyceride 123 03/26/2019 03:30 AM  
 
 
Lab Results Component Value Date/Time ALT (SGPT) 9 03/26/2019 03:30 AM  
 AST (SGOT) 10 03/26/2019 03:30 AM  
 Alk. phosphatase 80 03/26/2019 03:30 AM  
 Bilirubin, direct 0.2 10/27/2017 10:50 PM  
 Bilirubin, total 0.4 03/26/2019 03:30 AM  
 
 
Lab Results Component Value Date/Time GFR est AA 63 03/26/2019 03:30 AM  
 GFR est non-AA 54 (L) 03/26/2019 03:30 AM  
 Creatinine 1.18 (H) 03/26/2019 03:30 AM  
 BUN 19 03/26/2019 03:30 AM  
 Sodium 139 03/26/2019 03:30 AM  
 Potassium 3.7 03/26/2019 03:30 AM  
 Chloride 97 03/26/2019 03:30 AM  
 CO2 23 03/26/2019 03:30 AM  
  
 
 
 
Assessment / Plan  
 
preDiabetes fairly well controlled  with diet. Pt will try to lose more weight to improve further. Hypertension fairly well controlled, on Zestoretic Hyperlipidemia poorly controlled, will try to improve with weight loss. If unable consider trying for Repatha again since she cannot tolerate statins. ICD-10-CM ICD-9-CM 1. Essential hypertension with goal blood pressure less than 140/90 S93 571.6 METABOLIC PANEL, COMPREHENSIVE 2. High cholesterol E78.00 272.0 LIPID PANEL 3. Vitamin D deficiency E55.9 268.9 Uncontrolled. Pt to take vit D 5000 units/day on a regular basis. VITAMIN D, 25 HYDROXY 4. Morbid obesity due to excess calories ([address] E66.01 278.01 Pt will continue to work on cutting back calories to lose weight. 5. Seizure ([address] R56.9 780.39 Controlled on Keppra 1 gm/day. Pt to f/u with Neurology 6. Prediabetes R73.03 790.29 HEMOGLOBIN A1C W/O EAG 7. Right foot pain M79.671 729.5 ? H/o gout will check URIC ACID Diabetic issues reviewed with her: diabetic diet discussed in detail, and low cholesterol diet, weight control and daily exercise discussed. Follow-up and Dispositions · Return in about 6 months (around 9/26/2019). Reviewed plan of care. Patient has provided input and agrees with goals.

## 2019-04-16 ENCOUNTER — OFFICE VISIT (OUTPATIENT)
Dept: NEUROLOGY | Age: 49
End: 2019-04-16

## 2019-04-16 VITALS
DIASTOLIC BLOOD PRESSURE: 80 MMHG | SYSTOLIC BLOOD PRESSURE: 126 MMHG | HEIGHT: 69 IN | BODY MASS INDEX: 43.4 KG/M2 | OXYGEN SATURATION: 98 % | HEART RATE: 86 BPM | RESPIRATION RATE: 18 BRPM | WEIGHT: 293 LBS | TEMPERATURE: 98.9 F

## 2019-04-16 DIAGNOSIS — R56.9 SEIZURE (HCC): ICD-10-CM

## 2019-04-16 RX ORDER — LEVETIRACETAM 1000 MG/1
TABLET ORAL
Qty: 180 TAB | Refills: 3 | Status: SHIPPED | OUTPATIENT
Start: 2019-04-16 | End: 2020-03-13

## 2019-04-16 NOTE — PATIENT INSTRUCTIONS
Thank you for choosing [facility] for your  
 
care. You may receive a survey about your visit. We appreciate you taking time  
 
to complete this survey as we use your feedback to improve our services. We  
 
realize we are not perfect, but we strive to provide excellent care.

## 2019-04-16 NOTE — PROGRESS NOTES
Re:  Chi Martinez up visit     4/16/2019 10:30 AM 
 
SSN: [id number] Subjective:   Rena Baer returns for follow up. She was seen in the hospital for seizure activity back in October 27, 2017. Prior to that she had several episodes of altered consciousness and possible seizure activity. This was the first spell of definitive seizure activity. She's had no spells since being on the Keppra. No seizures in the last 21 months. Medications:   
Current Outpatient Medications Medication Sig Dispense Refill  lisinopril-hydroCHLOROthiazide (PRINZIDE, ZESTORETIC) 20-25 mg per tablet take 1 tablet by mouth once daily 30 Tab 5  furosemide (LASIX) 40 mg tablet take 1 tablet by mouth once daily 90 Tab 3  
 levETIRAcetam 1,000 mg tablet take 1 tablet by mouth twice a day 60 Tab 11  
 ibuprofen (MOTRIN) 800 mg tablet take 1 tablet by mouth every 8 hours if needed for pain 90 Tab 0  cyclobenzaprine (FLEXERIL) 5 mg tablet Take 1 Tab by mouth three (3) times daily as needed for Muscle Spasm(s). 90 Tab 2  potassium chloride (K-DUR, KLOR-CON) 20 mEq tablet Take 1 Tab by mouth two (2) times a day. 60 Tab 3  
 OTHER EEG Dx: suspected seizure disorder Fax results to Dr. Kanika Schafer and Dr. Tavon Reeves 1 Each 0  
 OTHER EEG Dx: Suspected seizure disorder Fax results to Dr. Kanika Schafer and Dr. Tavon Reeves 1 Each 0  
 OTHER This is to certify that Blair Rock was admitted to [facility] from 10/27/2017 to 10/28/2017. She may may return to work on 11/06/2017. Please feel free to contact my office if you have any questions or concerns. Thank you. 1 Each 0  
 OTHER Outpatient EEG Diagnosis: Seizure disorder Results to Dr. Tavon Reeves 1 Each 0  
 cholecalciferol, vitamin D3, (VITAMIN D3) 2,000 unit tab Take  by mouth. Indications: Vitamin D Deficiency  COMBIGAN 0.2-0.5 % drop ophthalmic solution   0  
 prednisoLONE acetate (PRED FORTE) 1 % ophthalmic suspension   0  
 
 
 Vital signs:   
Visit Vitals /80 (BP 1 Location: Left arm, BP Patient Position: Sitting) Pulse 86 Temp 98.9 °F (37.2 °C) Resp 18 Ht 5' 9\" (1.753 m) Wt (!) 172.4 kg (380 lb) SpO2 98% BMI 56.12 kg/m² Review of Systems: As above otherwise 11 point review of systems negative including;  
Constitutional no fever or chills Skin denies rash or itching HEENT  Denies tinnitus, hearing lose Eyes denies diplopia vision lose Respiratory denies sortness of breath Cardiovascular denies chest pain, dyspnea on exertion Gastrointestinal denies nausea, vomiting, diarrhea, constipation Genitourinary denies incontinence Musculoskeletal denies joint pain or swelling Endocrine denies weight change Hematology denies easy bruising or bleeding Neurological as above in HPI Patient Active Problem List  
Diagnosis Code  Sarcoidosis D86.9  Borderline diabetes R73.03  
 Glaucoma H40.9  Morbid obesity due to excess calories (HCC) E66.01  
 High cholesterol E78.00  DM (diabetes mellitus), type 2, uncontrolled ([address] E11.65  Seizure ([address] R56.9  Seizures ([address] R56.9  Vitamin D deficiency E55.9 Objective: The patient is awake, alert, and oriented x 4. Fund of knowledge is adequate. Speech is fluent and memory is intact. Cranial Nerves: II  Visual fields are full to confrontation. III, IV, VI  Extraocular movements are intact. There is no nystagmus. V  Facial sensation is intact to pinprick. VII  Face is symmetrical.  VIII - Hearing is present. IX, X, XII  Palate is symmetrical.   XI - Shoulder shrugging and head turning intact Motor: The patient moves all four limbs fairly well and symmetrically. Tone is normal. Reflexes are 2+ and symmetrical. Plantars are down going. Gait is antalgic, bent over, but better than last time. CBC:  
Lab Results Component Value Date/Time  WBC 8.3 10/27/2017 10:50 PM  
 RBC 4.13 (L) 10/27/2017 10:50 PM  
 HGB 12.1 10/27/2017 10:50 PM  
 HCT 36.3 10/27/2017 10:50 PM  
 PLATELET 717 11/39/7454 10:50 PM  
 
BMP:  
Lab Results Component Value Date/Time Glucose 87 03/26/2019 03:30 AM  
 Sodium 139 03/26/2019 03:30 AM  
 Potassium 3.7 03/26/2019 03:30 AM  
 Chloride 97 03/26/2019 03:30 AM  
 CO2 23 03/26/2019 03:30 AM  
 BUN 19 03/26/2019 03:30 AM  
 Creatinine 1.18 (H) 03/26/2019 03:30 AM  
 Calcium 9.6 03/26/2019 03:30 AM  
 
CMP:  
Lab Results Component Value Date/Time Glucose 87 03/26/2019 03:30 AM  
 Sodium 139 03/26/2019 03:30 AM  
 Potassium 3.7 03/26/2019 03:30 AM  
 Chloride 97 03/26/2019 03:30 AM  
 CO2 23 03/26/2019 03:30 AM  
 BUN 19 03/26/2019 03:30 AM  
 Creatinine 1.18 (H) 03/26/2019 03:30 AM  
 Calcium 9.6 03/26/2019 03:30 AM  
 Anion gap 15.0 04/20/2018 02:52 PM  
 BUN/Creatinine ratio 16 03/26/2019 03:30 AM  
 Alk. phosphatase 80 03/26/2019 03:30 AM  
 Protein, total 7.4 03/26/2019 03:30 AM  
 Albumin 4.2 03/26/2019 03:30 AM  
 Globulin 2.9 04/20/2018 02:52 PM  
 A-G Ratio 1.3 03/26/2019 03:30 AM  
 
Coagulation: No results found for: PTP, INR, APTT, PTTT Cardiac markers:  
Lab Results Component Value Date/Time CK 81 10/27/2017 10:50 PM  
 CK-MB Index  10/27/2017 10:50 PM  
  CALCULATION NOT PERFORMED WHEN RESULT IS BELOW LINEAR LIMIT Assessment:  New onset seizure activity who has risk factors including none. She's doing well currently on the [address]. New back and leg pain especially when she's standing. She's suffering from spinal stenosis. Plan:  Continue current medication and return here in 6 months. Sincerely, 
 
 
 
Emily Elise M.D.

## 2019-04-16 NOTE — PROGRESS NOTES
Chief Complaint Patient presents with  Seizure  
  follow up , last seizure 2017  
 
3 most recent PHQ Screens 4/16/2019 PHQ Not Done - Little interest or pleasure in doing things Not at all Feeling down, depressed, irritable, or hopeless Not at all Total Score PHQ 2 0

## 2019-04-16 NOTE — LETTER
4/16/19 Patient: Vinnie Odell YOB: 1970 Date of Visit: 4/16/2019 Hoang Paz MD 
[id number] [address] VIA In Basket Dear Hoang Paz MD, Thank you for referring Ms. Marland Galeazzi to [facility] for evaluation. My notes for this consultation are attached. If you have questions, please do not hesitate to call me. I look forward to following your patient along with you.  
 
 
Sincerely, 
 
Bibi Holt MD

## 2019-05-13 NOTE — PROGRESS NOTES
Tell patient his/her vit D level is low. She should take vit D 5000 units/day OTC. Cholesterol remains high so continue to work on weight loss since she does not tolerate cholesterol meds. Her blood test for gout is high so if she has any problems with it let us know

## 2019-05-13 NOTE — PROGRESS NOTES
Left message for patient to call the office. Re:vit D level is low. She should take vit D 5000 units/day OTC. Cholesterol remains high so continue to work on weight loss since she does not tolerate cholesterol meds. Her blood test for gout is high so if she has any problems with it let us know

## 2019-07-16 RX ORDER — IBUPROFEN 800 MG/1
TABLET ORAL
Qty: 90 TAB | Refills: 0 | Status: SHIPPED | OUTPATIENT
Start: 2019-07-16 | End: 2019-12-27

## 2019-07-18 RX ORDER — INDOMETHACIN 50 MG/1
CAPSULE ORAL
Qty: 60 CAP | Refills: 0 | Status: SHIPPED | OUTPATIENT
Start: 2019-07-18 | End: 2019-08-11 | Stop reason: SDUPTHER

## 2019-08-12 RX ORDER — INDOMETHACIN 50 MG/1
CAPSULE ORAL
Qty: 60 CAP | Refills: 0 | Status: SHIPPED | OUTPATIENT
Start: 2019-08-12 | End: 2019-09-10 | Stop reason: SDUPTHER

## 2019-09-10 RX ORDER — INDOMETHACIN 50 MG/1
CAPSULE ORAL
Qty: 60 CAP | Refills: 0 | Status: SHIPPED | OUTPATIENT
Start: 2019-09-10 | End: 2019-10-07 | Stop reason: SDUPTHER

## 2019-10-07 RX ORDER — INDOMETHACIN 50 MG/1
CAPSULE ORAL
Qty: 60 CAP | Refills: 0 | Status: SHIPPED | OUTPATIENT
Start: 2019-10-07 | End: 2019-11-04 | Stop reason: SDUPTHER

## 2019-11-25 RX ORDER — FUROSEMIDE 40 MG/1
TABLET ORAL
Qty: 30 TAB | Refills: 0 | Status: SHIPPED | OUTPATIENT
Start: 2019-11-25 | End: 2020-01-12

## 2019-11-25 RX ORDER — INDOMETHACIN 50 MG/1
CAPSULE ORAL
Qty: 60 CAP | Refills: 0 | Status: SHIPPED | OUTPATIENT
Start: 2019-11-25 | End: 2019-12-20

## 2019-12-20 RX ORDER — INDOMETHACIN 50 MG/1
CAPSULE ORAL
Qty: 60 CAP | Refills: 0 | Status: SHIPPED | OUTPATIENT
Start: 2019-12-20 | End: 2020-03-05 | Stop reason: ALTCHOICE

## 2019-12-27 ENCOUNTER — OFFICE VISIT (OUTPATIENT)
Dept: FAMILY MEDICINE CLINIC | Age: 49
End: 2019-12-27

## 2019-12-27 ENCOUNTER — HOSPITAL ENCOUNTER (OUTPATIENT)
Dept: LAB | Age: 49
Discharge: HOME | End: 2019-12-27
Payer: SELF-PAY

## 2019-12-27 VITALS
SYSTOLIC BLOOD PRESSURE: 131 MMHG | HEIGHT: 69 IN | BODY MASS INDEX: 56.12 KG/M2 | HEART RATE: 96 BPM | DIASTOLIC BLOOD PRESSURE: 76 MMHG | RESPIRATION RATE: 20 BRPM | OXYGEN SATURATION: 97 % | TEMPERATURE: 99 F

## 2019-12-27 DIAGNOSIS — R73.03 PREDIABETES: ICD-10-CM

## 2019-12-27 DIAGNOSIS — E55.9 VITAMIN D DEFICIENCY: ICD-10-CM

## 2019-12-27 DIAGNOSIS — M1A.9XX0 CHRONIC GOUT INVOLVING TOE OF RIGHT FOOT WITHOUT TOPHUS, UNSPECIFIED CAUSE: ICD-10-CM

## 2019-12-27 DIAGNOSIS — E78.00 HIGH CHOLESTEROL: ICD-10-CM

## 2019-12-27 DIAGNOSIS — E66.01 MORBID OBESITY DUE TO EXCESS CALORIES (HCC): ICD-10-CM

## 2019-12-27 DIAGNOSIS — I10 ESSENTIAL HYPERTENSION WITH GOAL BLOOD PRESSURE LESS THAN 140/90: Primary | ICD-10-CM

## 2019-12-27 DIAGNOSIS — I10 ESSENTIAL HYPERTENSION WITH GOAL BLOOD PRESSURE LESS THAN 140/90: ICD-10-CM

## 2019-12-27 LAB
25(OH)D3 SERPL-MCNC: 26.1 NG/ML (ref 30–100)
ALBUMIN SERPL-MCNC: 4 G/DL (ref 3.4–5)
ALBUMIN/GLOB SERPL: 1 {RATIO} (ref 0.8–1.7)
ALP SERPL-CCNC: 81 U/L (ref 45–117)
ALT SERPL-CCNC: 15 U/L (ref 13–56)
ANION GAP SERPL CALC-SCNC: 9 MMOL/L (ref 3–18)
AST SERPL-CCNC: 11 U/L (ref 10–38)
BILIRUB SERPL-MCNC: 0.9 MG/DL (ref 0.2–1)
BUN SERPL-MCNC: 32 MG/DL (ref 7–18)
BUN/CREAT SERPL: 26 (ref 12–20)
CALCIUM SERPL-MCNC: 9.8 MG/DL (ref 8.5–10.1)
CHLORIDE SERPL-SCNC: 105 MMOL/L (ref 100–111)
CHOLEST SERPL-MCNC: 269 MG/DL
CO2 SERPL-SCNC: 25 MMOL/L (ref 21–32)
CREAT SERPL-MCNC: 1.24 MG/DL (ref 0.6–1.3)
CREAT UR-MCNC: 83 MG/DL (ref 30–125)
GLOBULIN SER CALC-MCNC: 4.2 G/DL (ref 2–4)
GLUCOSE SERPL-MCNC: 95 MG/DL (ref 74–99)
HBA1C MFR BLD: 6 % (ref 4.2–5.6)
HDLC SERPL-MCNC: 49 MG/DL (ref 40–60)
HDLC SERPL: 5.5 {RATIO} (ref 0–5)
LDLC SERPL CALC-MCNC: 191.2 MG/DL (ref 0–100)
LIPID PROFILE,FLP: ABNORMAL
MICROALBUMIN UR-MCNC: <0.5 MG/DL (ref 0–3)
MICROALBUMIN/CREAT UR-RTO: NORMAL MG/G (ref 0–30)
POTASSIUM SERPL-SCNC: 4.2 MMOL/L (ref 3.5–5.5)
PROT SERPL-MCNC: 8.2 G/DL (ref 6.4–8.2)
SODIUM SERPL-SCNC: 139 MMOL/L (ref 136–145)
TRIGL SERPL-MCNC: 144 MG/DL (ref ?–150)
URATE SERPL-MCNC: 9.2 MG/DL (ref 2.6–7.2)
VLDLC SERPL CALC-MCNC: 28.8 MG/DL

## 2019-12-27 PROCEDURE — 84550 ASSAY OF BLOOD/URIC ACID: CPT

## 2019-12-27 PROCEDURE — 80053 COMPREHEN METABOLIC PANEL: CPT

## 2019-12-27 PROCEDURE — 82043 UR ALBUMIN QUANTITATIVE: CPT

## 2019-12-27 PROCEDURE — 82306 VITAMIN D 25 HYDROXY: CPT

## 2019-12-27 PROCEDURE — 36415 COLL VENOUS BLD VENIPUNCTURE: CPT

## 2019-12-27 PROCEDURE — 80061 LIPID PANEL: CPT

## 2019-12-27 PROCEDURE — 83036 HEMOGLOBIN GLYCOSYLATED A1C: CPT

## 2019-12-27 RX ORDER — LISINOPRIL 40 MG/1
40 TABLET ORAL DAILY
Qty: 30 TAB | Refills: 5 | Status: SHIPPED | OUTPATIENT
Start: 2019-12-27 | End: 2020-01-21 | Stop reason: SINTOL

## 2019-12-27 RX ORDER — ALLOPURINOL 300 MG/1
300 TABLET ORAL DAILY
Qty: 30 TAB | Refills: 5 | Status: SHIPPED | OUTPATIENT
Start: 2019-12-27 | End: 2020-01-01

## 2019-12-27 RX ORDER — COLCHICINE 0.6 MG/1
0.6 CAPSULE ORAL 2 TIMES DAILY
Qty: 60 CAP | Refills: 5 | Status: SHIPPED | OUTPATIENT
Start: 2019-12-27 | End: 2020-01-01

## 2019-12-27 NOTE — PROGRESS NOTES
Subjective:       Chief Complaint  The patient presents for follow up of diabetes, hypertension and high cholesterol. ANGY Ibanez is a 52 y.o. female seen for follow up of diabetes. Shealso has hypertension and hyperlipidemia. Diabetes  controlled, with diet, pt continues to work on losing weight to improve further, hypertension well controlled, no significant medication side effects noted, on Zestoretic however because she has gout will change to lisinopril 40 mg and stop HCTZ,, hyperlipidemia poorly controlled, pt had syncope on multiple statins including Zocor and Pravachol. Would not try more statins with this pt. Pt has LDL~ 190 but did not qualify for Repatha in the past. Will try again if LDL not improving with weight loss. Diet and Lifestyle: generally follows a low fat low cholesterol diet, follows a diabetic diet regularly, exercises sporadically    Home BP Monitoring: is well controlled. Diabetic Review of Systems - home glucose monitoring: is not performed. Other symptoms and concerns: pt has significant medical problems due to her morbid obesity including severe edema and lymphedema in her lower extremities that cuases pain and difficulty with pt walking and standing for more than 15 minutes. Pt uses Lasix prn. Pt also has h/o of sarcoidosis that contributes to shortness of breath along with her morbid obesity with h/o longterm prednisone use. Pt therefore gets very short of breath with walking even 1 block. Pt is currently being seen by neurology for seizures and is down to Keppra 1 gm/day    Pt currently not taking any vitamin D supplementation for vitamin D deficiency. Due to her low levels would start on vitamin D 2000 units/day    Patient has gout which is affecting her right foot for which she has been using Indocin twice a day for the last few months. She was educated to try and stop the Indocin while we transition her to colchicine and allopurinol.     Current Outpatient Medications   Medication Sig    colchicine (MITIGARE) 0.6 mg capsule Take 1 Cap by mouth two (2) times a day.  allopurinol (ZYLOPRIM) 300 mg tablet Take 1 Tab by mouth daily.  lisinopril (PRINIVIL, ZESTRIL) 40 mg tablet Take 1 Tab by mouth daily.  indomethacin (INDOCIN) 50 mg capsule take 1 capsule by mouth three times a day after meals    furosemide (LASIX) 40 mg tablet take 1 tablet by mouth once daily    levETIRAcetam 1,000 mg tablet take 1 tablet by mouth twice a day    cyclobenzaprine (FLEXERIL) 5 mg tablet Take 1 Tab by mouth three (3) times daily as needed for Muscle Spasm(s).  OTHER EEG  Dx: suspected seizure disorder  Fax results to Dr. Becka Amos and Dr. Niki Amin EEG  Dx: Suspected seizure disorder  Fax results to Dr. Becka Amos and Dr. Niki mAin This is to certify that Burton Hdz was admitted to [facility] from 10/27/2017 to 10/28/2017. She may may return to work on 11/06/2017. Please feel free to contact my office if you have any questions or concerns. Thank you.  OTHER Outpatient EEG  Diagnosis: Seizure disorder  Results to Dr. Cherry Sumner cholecalciferol, vitamin D3, (VITAMIN D3) 2,000 unit tab Take  by mouth. Indications: Vitamin D Deficiency    COMBIGAN 0.2-0.5 % drop ophthalmic solution     prednisoLONE acetate (PRED FORTE) 1 % ophthalmic suspension      No current facility-administered medications for this visit.               Review of Systems  Respiratory: negative for dyspnea on exertion  Cardiovascular: negative for chest pain    Objective:     Visit Vitals  /76   Pulse 96   Temp 99 °F (37.2 °C) (Oral)   Resp 20   Ht 5' 9\" (1.753 m)   SpO2 97%   BMI 56.12 kg/m²        Chest - clear to auscultation, no wheezes, rales or rhonchi, symmetric air entry  Heart - normal rate, regular rhythm, normal S1, S2, no murmurs, rubs, clicks or gallops  Extremities - pedal edema 2 + bilaterally       Labs:   Lab Results   Component Value Date/Time Hemoglobin A1c 6.0 (H) 12/27/2019 02:47 PM    Hemoglobin A1c 6.4 (H) 03/26/2019 03:30 AM    Hemoglobin A1c 6.2 (H) 04/20/2018 02:52 PM    Glucose 95 12/27/2019 02:47 PM    Glucose (POC) 119 (H) 10/28/2017 06:29 AM    Microalbumin/Creat ratio (mg/g creat)  12/27/2019 02:47 PM     Cannot calculate ratio due to microalbumin result outside reportable range. Microalbumin,urine random <0.50 12/27/2019 02:47 PM    LDL, calculated 191.2 (H) 12/27/2019 02:47 PM    Creatinine 1.24 12/27/2019 02:47 PM      Lab Results   Component Value Date/Time    Cholesterol, total 269 (H) 12/27/2019 02:47 PM    HDL Cholesterol 49 12/27/2019 02:47 PM    LDL, calculated 191.2 (H) 12/27/2019 02:47 PM    Triglyceride 144 12/27/2019 02:47 PM    CHOL/HDL Ratio 5.5 (H) 12/27/2019 02:47 PM       Lab Results   Component Value Date/Time    ALT (SGPT) 15 12/27/2019 02:47 PM    AST (SGOT) 11 12/27/2019 02:47 PM    Alk. phosphatase 81 12/27/2019 02:47 PM    Bilirubin, direct 0.2 10/27/2017 10:50 PM    Bilirubin, total 0.9 12/27/2019 02:47 PM       Lab Results   Component Value Date/Time    GFR est AA 56 (L) 12/27/2019 02:47 PM    GFR est non-AA 46 (L) 12/27/2019 02:47 PM    Creatinine 1.24 12/27/2019 02:47 PM    BUN 32 (H) 12/27/2019 02:47 PM    Sodium 139 12/27/2019 02:47 PM    Potassium 4.2 12/27/2019 02:47 PM    Chloride 105 12/27/2019 02:47 PM    CO2 25 12/27/2019 02:47 PM            Assessment / Plan     preDiabetes fairly well controlled  with diet. Pt will try to lose more weight to improve further. Hypertension  well controlled, will change to lisinopril 40 mg  Hyperlipidemia poorly controlled, will try to improve with weight loss. If unable consider trying for Repatha again since she cannot tolerate statins. ICD-10-CM ICD-9-CM    1. Essential hypertension with goal blood pressure less than 140/90 T78 692.4 METABOLIC PANEL, COMPREHENSIVE   2.  High cholesterol E78.00 272.0 LIPID PANEL   3. Vitamin D deficiency E55.9 268.9  uncontrolled patient to start on vitamin D 2000 units/day vITAMIN D, 25 HYDROXY   4. Prediabetes R73.03 790.29 HEMOGLOBIN A1C W/O EAG      MICROALBUMIN, UR, RAND W/ MICROALB/CREAT RATIO   5. Chronic gout involving toe of right foot without tophus, unspecified cause M1A. 9XX0 274.02  will check URIC ACID      Start on colchicine (MITIGARE) 0.6 mg capsule      allopurinol (ZYLOPRIM) 300 mg tablet   6. Morbid obesity due to excess calories ([facility] [address] E66.01 278.01  patient will continue to work on trying to cut back starches and sugars to lose weight. Diabetic issues reviewed with her: diabetic diet discussed in detail, and low cholesterol diet, weight control and daily exercise discussed. Follow-up and Dispositions    · Return in about 6 months (around 6/27/2020). Reviewed plan of care. Patient has provided input and agrees with goals.

## 2019-12-29 PROBLEM — E11.21 TYPE 2 DIABETES WITH NEPHROPATHY (HCC): Status: ACTIVE | Noted: 2019-12-29

## 2019-12-29 NOTE — PROGRESS NOTES
Tell patient his/her vit D level is low. She should take vit D 2000 units/day OTC. Her cholesterol remains elevated. Continue to work on losing weight to improve it.  Rest of blood work is ok

## 2020-01-01 ENCOUNTER — HOSPITAL ENCOUNTER (OUTPATIENT)
Dept: LAB | Age: 50
Discharge: HOME | End: 2020-11-05
Payer: COMMERCIAL

## 2020-01-01 ENCOUNTER — APPOINTMENT (OUTPATIENT)
Dept: INTERNAL MEDICINE CLINIC | Age: 50
End: 2020-01-01

## 2020-01-01 ENCOUNTER — TELEPHONE (OUTPATIENT)
Dept: INTERNAL MEDICINE CLINIC | Age: 50
End: 2020-01-01

## 2020-01-01 ENCOUNTER — HOSPITAL ENCOUNTER (OUTPATIENT)
Dept: LAB | Age: 50
Discharge: HOME | End: 2020-11-24
Payer: COMMERCIAL

## 2020-01-01 ENCOUNTER — CLINICAL SUPPORT (OUTPATIENT)
Dept: FAMILY MEDICINE CLINIC | Age: 50
End: 2020-01-01

## 2020-01-01 ENCOUNTER — VIRTUAL VISIT (OUTPATIENT)
Dept: INTERNAL MEDICINE CLINIC | Age: 50
End: 2020-01-01

## 2020-01-01 DIAGNOSIS — Z20.822 CLOSE EXPOSURE TO COVID-19 VIRUS: ICD-10-CM

## 2020-01-01 DIAGNOSIS — E11.21 TYPE 2 DIABETES WITH NEPHROPATHY (HCC): ICD-10-CM

## 2020-01-01 DIAGNOSIS — I10 ESSENTIAL HYPERTENSION: Primary | ICD-10-CM

## 2020-01-01 DIAGNOSIS — E66.01 MORBID OBESITY DUE TO EXCESS CALORIES (HCC): ICD-10-CM

## 2020-01-01 DIAGNOSIS — Z20.822 CLOSE EXPOSURE TO COVID-19 VIRUS: Primary | ICD-10-CM

## 2020-01-01 DIAGNOSIS — E55.9 VITAMIN D DEFICIENCY: ICD-10-CM

## 2020-01-01 DIAGNOSIS — Z20.828 EXPOSURE TO SARS-ASSOCIATED CORONAVIRUS: Primary | ICD-10-CM

## 2020-01-01 DIAGNOSIS — I10 ESSENTIAL HYPERTENSION: ICD-10-CM

## 2020-01-01 DIAGNOSIS — R56.9 SEIZURE (HCC): ICD-10-CM

## 2020-01-01 LAB
25(OH)D3 SERPL-MCNC: 41.2 NG/ML (ref 30–100)
ALBUMIN SERPL-MCNC: 3.5 G/DL (ref 3.4–5)
ALBUMIN/GLOB SERPL: 0.9 {RATIO} (ref 0.8–1.7)
ALP SERPL-CCNC: 67 U/L (ref 45–117)
ALT SERPL-CCNC: 13 U/L (ref 13–56)
ANION GAP SERPL CALC-SCNC: 9 MMOL/L (ref 3–18)
AST SERPL-CCNC: 10 U/L (ref 10–38)
BILIRUB SERPL-MCNC: 0.4 MG/DL (ref 0.2–1)
BUN SERPL-MCNC: 23 MG/DL (ref 7–18)
BUN/CREAT SERPL: 18 (ref 12–20)
CALCIUM SERPL-MCNC: 9.3 MG/DL (ref 8.5–10.1)
CHLORIDE SERPL-SCNC: 105 MMOL/L (ref 100–111)
CHOLEST SERPL-MCNC: 235 MG/DL
CO2 SERPL-SCNC: 25 MMOL/L (ref 21–32)
CREAT SERPL-MCNC: 1.25 MG/DL (ref 0.6–1.3)
EUROIMMUN SARS-COV-2 AB, IGG, RCVG1T: POSITIVE
GLOBULIN SER CALC-MCNC: 3.8 G/DL (ref 2–4)
GLUCOSE SERPL-MCNC: 95 MG/DL (ref 74–99)
HBA1C MFR BLD: 5.8 % (ref 4.2–5.6)
HDLC SERPL-MCNC: 52 MG/DL (ref 40–60)
HDLC SERPL: 4.5 {RATIO} (ref 0–5)
LDLC SERPL CALC-MCNC: 162.2 MG/DL (ref 0–100)
LIPID PROFILE,FLP: ABNORMAL
POTASSIUM SERPL-SCNC: 3.6 MMOL/L (ref 3.5–5.5)
PROT SERPL-MCNC: 7.3 G/DL (ref 6.4–8.2)
SARS-COV-2 AB, IGG, CORG1M: NORMAL
SARS-COV-2, COV2NT: NOT DETECTED
SODIUM SERPL-SCNC: 139 MMOL/L (ref 136–145)
TRIGL SERPL-MCNC: 104 MG/DL (ref ?–150)
VLDLC SERPL CALC-MCNC: 20.8 MG/DL

## 2020-01-01 PROCEDURE — 36415 COLL VENOUS BLD VENIPUNCTURE: CPT

## 2020-01-01 PROCEDURE — 83036 HEMOGLOBIN GLYCOSYLATED A1C: CPT

## 2020-01-01 PROCEDURE — 80053 COMPREHEN METABOLIC PANEL: CPT

## 2020-01-01 PROCEDURE — 87635 SARS-COV-2 COVID-19 AMP PRB: CPT

## 2020-01-01 PROCEDURE — 80061 LIPID PANEL: CPT

## 2020-01-01 PROCEDURE — 82306 VITAMIN D 25 HYDROXY: CPT

## 2020-01-01 RX ORDER — LEVETIRACETAM 1000 MG/1
TABLET ORAL
Qty: 180 TAB | Refills: 1 | Status: SHIPPED | OUTPATIENT
Start: 2020-01-01

## 2020-01-01 RX ORDER — IBUPROFEN 800 MG/1
TABLET ORAL
Qty: 90 TAB | Refills: 3 | Status: SHIPPED | OUTPATIENT
Start: 2020-01-01 | End: 2021-01-01

## 2020-01-01 RX ORDER — LISINOPRIL AND HYDROCHLOROTHIAZIDE 20; 25 MG/1; MG/1
TABLET ORAL
Qty: 30 TAB | Refills: 5 | Status: SHIPPED | OUTPATIENT
Start: 2020-01-01 | End: 2021-01-01

## 2020-01-01 RX ORDER — IBUPROFEN 800 MG/1
TABLET ORAL
Qty: 90 TAB | Refills: 3 | Status: SHIPPED | OUTPATIENT
Start: 2020-01-01 | End: 2020-01-01

## 2020-01-01 RX ORDER — FUROSEMIDE 40 MG/1
TABLET ORAL
Qty: 30 TAB | Refills: 5 | Status: SHIPPED | OUTPATIENT
Start: 2020-01-01 | End: 2021-01-01

## 2020-01-02 NOTE — PROGRESS NOTES
Patient aware tell patient his/her vit D level is low. She should take vit D 2000 units/day OTC. Her cholesterol remains elevated. Continue to work on losing weight to improve it. Rest of blood work is ok. Patient verbalizes understanding.

## 2020-01-21 RX ORDER — LISINOPRIL AND HYDROCHLOROTHIAZIDE 20; 25 MG/1; MG/1
TABLET ORAL
Qty: 30 TAB | Refills: 5 | Status: SHIPPED | OUTPATIENT
Start: 2020-01-21 | End: 2020-01-01

## 2020-01-21 NOTE — TELEPHONE ENCOUNTER
Pt want to speak with you stating it is very important please advise [phone]. She states it is about her bp medication.  She is feeling very heavy and weak

## 2020-01-21 NOTE — TELEPHONE ENCOUNTER
Patient states lisinopril is causing her to feel heavy and weak. Patient would like to go back on Lisinopril -HCTZ 20-25 mg. Patient states she did not have a problem with that. Please advise.

## 2020-07-08 NOTE — PROGRESS NOTES
Ilene Connor is a 48 y.o. female, evaluated via audio-only technology on 7/8/2020 for Hypertension; Morbid Obesity; Vitamin D Deficiency; and Cholesterol Problem [place] Pass Assessment & Plan:  
Diagnoses and all orders for this visit: 1. Essential hypertension -     METABOLIC PANEL, COMPREHENSIVE; Future -     LIPID PANEL; Future 2. Type 2 diabetes with nephropathy (HCC) 
-     HEMOGLOBIN A1C W/O EAG; Future 3. Vitamin D deficiency 
-     VITAMIN D, 25 HYDROXY; Future 4. Morbid obesity due to excess calories (Jessica Ply) 12 Subjective:  
Patient's hypertension is well controlled on Zestoretic 20/25. Patient has been doing well with weight loss and has lost about 30 pounds since she was seen about 6 months ago. This will hopefully help to decrease her cholesterol since she has had difficulty taking statins with having syncopal episodes both times that she used them. Patient continues on vitamin D supplementation. She also continues on seizure medications for seizure disorder. Hopefully with her weight loss this will control her prediabetes and she will not need to have any medications. Prior to Admission medications Medication Sig Start Date End Date Taking? Authorizing Provider  
furosemide (LASIX) 40 mg tablet take 1 tablet by mouth once daily 7/3/20   Clarice Galloway MD  
levETIRAcetam 1,000 mg tablet take 1 tablet by mouth twice a day 3/13/20   Clarice Galloway MD  
ibuprofen (MOTRIN) 800 mg tablet take 1 tablet by mouth every 8 hours if needed for pain 3/5/20   Clarice Galloway MD  
lisinopril-hydroCHLOROthiazide (PRINZIDE, ZESTORETIC) 20-25 mg per tablet take 1 tablet by mouth once daily 1/21/20   Rick Read MD  
cyclobenzaprine (FLEXERIL) 5 mg tablet Take 1 Tab by mouth three (3) times daily as needed for Muscle Spasm(s). 12/19/18   Erroll Eisenmenger, MD  
OTHER EEG Dx: suspected seizure disorder Fax results to Dr. Volodymyr Lucas and Dr. Donnita Goldmann 11/10/17   Bailey Calero NP  
OTHER EEG 
 Dx: Suspected seizure disorder Fax results to Dr. Orestes Gutierrez and Dr. Zulma Larson 11/9/17   Karmen Kasper NP  
OTHER This is to certify that [facility]ully [facility]. She may may return to work on 11/06/2017. Please feel free to contact my office if you have any questions or concerns. Thank you. 10/30/17   Karmen Kasper NP  
OTHER Outpatient EEG Diagnosis: Seizure disorder Results to Dr. Zulma Larson 10/30/17   Demetrius ALEXANDER NP  
cholecalciferol, vitamin D3, (VITAMIN D3) 2,000 unit tab Take  by mouth. Indications: Vitamin D Deficiency    Provider, Historical  
COMBIGAN 0.2-0.5 % drop ophthalmic solution  5/20/16   Provider, Historical  
prednisoLONE acetate (PRED FORTE) 1 % ophthalmic suspension  5/9/16   Provider, Historical  
 
Patient Active Problem List  
Diagnosis Code  Sarcoidosis D86.9  Borderline diabetes R73.03  
 Glaucoma H40.9  Morbid obesity due to excess calories (HCC) E66.01  
 High cholesterol E78.00  DM (diabetes mellitus), type 2, uncontrolled ([address] E11.65  Seizure ([address] R56.9  Seizures ([address] R56.9  Vitamin D deficiency E55.9  Type 2 diabetes with nephropathy (HCC) E11.21  
 
 
ROS Patient-Reported Vitals 7/8/2020 Patient-Reported Weight 353 lbs Patient-Reported Systolic  348 Patient-Reported Diastolic 72 Cheryl Lamar, who was evaluated through a patient-initiated, synchronous (real-time) audio only encounter, and/or her healthcare decision maker, is aware that it is a billable service, with coverage as determined by her insurance carrier. She provided verbal consent to proceed: Yes. She has not had a related appointment within my department in the past 7 days or scheduled within the next 24 hours. Total Time: minutes: 5-10 minutes Follow-up and Dispositions · Return in about 3 months (around 10/8/2020) for labs 1 week before.  
  
 
 
Daniel Reis MD

## 2020-10-27 NOTE — TELEPHONE ENCOUNTER
Pt asking for a letter or advise re covid testing her employer asking for and her lung condition    Asking is there another way of testing without going thru the nose? Can a letter be given to her so that she does not have to have this testing because of her lung condition?

## 2020-10-28 NOTE — TELEPHONE ENCOUNTER
Pt called again asked to speak with Caleb Robison - pt is advised Dr Daniel Flores out of office this week please call back

## 2020-11-13 NOTE — TELEPHONE ENCOUNTER
Called [facility] and  states test was sent to [address] [place] [address] and spoke with Rosa she states she does not have any results on this patient. Dr. Stephenie Rasheed aware. Patient is aware COVID testing needs to be redrawn. Patient states this is very inconvenient for her. Patient states she does not know why she did not received anything stating Dr. Stephenie Rasheed has moved. Patient was informed letters were mailed out to patients in May 2020 regarding Dr. Pita Alvarado move. Patient scheduled for lab work.

## 2020-11-13 NOTE — TELEPHONE ENCOUNTER
----- Message from Kennedy Donohue MD sent at 11/13/2020 12:49 PM EST -----  For some reason her COVID-19 antibody test done about 10 days ago has not resulted.

## 2020-11-19 NOTE — TELEPHONE ENCOUNTER
Patient is aware she can call [facility] and schedule an appointment to have the test done. Phone number and address were provided to patient.

## 2020-11-19 NOTE — TELEPHONE ENCOUNTER
Pt blood test came back that she may have been exposed to COVID-19 so she is willing to go ahead and do nasal swab. Order placed.  Let her know places she can go such as [facility] or  or urgent care

## 2021-01-01 ENCOUNTER — TELEPHONE (OUTPATIENT)
Dept: INTERNAL MEDICINE CLINIC | Age: 51
End: 2021-01-01

## 2021-01-01 ENCOUNTER — HOSPITAL ENCOUNTER (OUTPATIENT)
Dept: LAB | Age: 51
Discharge: HOME | End: 2021-04-21
Payer: COMMERCIAL

## 2021-01-01 ENCOUNTER — APPOINTMENT (OUTPATIENT)
Dept: INTERNAL MEDICINE CLINIC | Age: 51
End: 2021-01-01

## 2021-01-01 ENCOUNTER — TELEPHONE (OUTPATIENT)
Dept: FAMILY MEDICINE CLINIC | Age: 51
End: 2021-01-01

## 2021-01-01 DIAGNOSIS — I10 ESSENTIAL HYPERTENSION WITH GOAL BLOOD PRESSURE LESS THAN 140/90: ICD-10-CM

## 2021-01-01 DIAGNOSIS — I10 ESSENTIAL HYPERTENSION WITH GOAL BLOOD PRESSURE LESS THAN 140/90: Primary | ICD-10-CM

## 2021-01-01 DIAGNOSIS — E78.00 HIGH CHOLESTEROL: ICD-10-CM

## 2021-01-01 DIAGNOSIS — R73.03 PREDIABETES: ICD-10-CM

## 2021-01-01 DIAGNOSIS — E55.9 VITAMIN D DEFICIENCY: ICD-10-CM

## 2021-01-01 LAB
25(OH)D3 SERPL-MCNC: 40.6 NG/ML (ref 30–100)
ALBUMIN SERPL-MCNC: 4 G/DL (ref 3.4–5)
ALBUMIN/GLOB SERPL: 0.9 {RATIO} (ref 0.8–1.7)
ALP SERPL-CCNC: 69 U/L (ref 45–117)
ALT SERPL-CCNC: 14 U/L (ref 13–56)
ANION GAP SERPL CALC-SCNC: 15 MMOL/L (ref 3–18)
AST SERPL-CCNC: 9 U/L (ref 10–38)
BILIRUB SERPL-MCNC: 0.4 MG/DL (ref 0.2–1)
BUN SERPL-MCNC: 102 MG/DL (ref 7–18)
BUN/CREAT SERPL: 24 (ref 12–20)
CALCIUM SERPL-MCNC: 9.8 MG/DL (ref 8.5–10.1)
CHLORIDE SERPL-SCNC: 103 MMOL/L (ref 100–111)
CHOLEST SERPL-MCNC: 296 MG/DL
CO2 SERPL-SCNC: 19 MMOL/L (ref 21–32)
CREAT SERPL-MCNC: 4.32 MG/DL (ref 0.6–1.3)
EST. AVERAGE GLUCOSE BLD GHB EST-MCNC: 137 MG/DL
GLOBULIN SER CALC-MCNC: 4.4 G/DL (ref 2–4)
GLUCOSE SERPL-MCNC: 108 MG/DL (ref 74–99)
HBA1C MFR BLD: 6.4 % (ref 4.2–5.6)
HDLC SERPL-MCNC: 38 MG/DL (ref 40–60)
HDLC SERPL: 7.8 {RATIO} (ref 0–5)
LDLC SERPL CALC-MCNC: 227.8 MG/DL (ref 0–100)
LIPID PROFILE,FLP: ABNORMAL
POTASSIUM SERPL-SCNC: 3.2 MMOL/L (ref 3.5–5.5)
PROT SERPL-MCNC: 8.4 G/DL (ref 6.4–8.2)
SODIUM SERPL-SCNC: 137 MMOL/L (ref 136–145)
TRIGL SERPL-MCNC: 151 MG/DL (ref ?–150)
VLDLC SERPL CALC-MCNC: 30.2 MG/DL

## 2021-01-01 PROCEDURE — 83036 HEMOGLOBIN GLYCOSYLATED A1C: CPT

## 2021-01-01 PROCEDURE — 82306 VITAMIN D 25 HYDROXY: CPT

## 2021-01-01 PROCEDURE — 80053 COMPREHEN METABOLIC PANEL: CPT

## 2021-01-01 PROCEDURE — 80061 LIPID PANEL: CPT

## 2021-01-01 RX ORDER — IBUPROFEN 800 MG/1
TABLET ORAL
Qty: 90 TAB | Refills: 0 | Status: SHIPPED | OUTPATIENT
Start: 2021-01-01

## 2021-01-01 RX ORDER — FUROSEMIDE 40 MG/1
TABLET ORAL
Qty: 30 TAB | Refills: 5 | Status: SHIPPED | OUTPATIENT
Start: 2021-01-01

## 2021-03-29 NOTE — TELEPHONE ENCOUNTER
Pt scheduled next month. She would not schedule sooner saying it is due to her work schedule. Advised we had sent a letter last month also that she needed the appt.

## 2021-04-22 NOTE — PROGRESS NOTES
Tell patient her labs show she is severely dehydrated and it is affecting her kidneys. She needs to go to [facility] ER today to be evaluated and get IV fluids and further evaluated.

## 2021-04-22 NOTE — TELEPHONE ENCOUNTER
Patient is asking for Jessica Daugherty to call her. Stating she is at [facility] ED and they are wanting to admit her for renal failure.

## 2021-04-22 NOTE — PROGRESS NOTES
Patient reached and informed of results per Dr. Aj Elizabeth. Patient made aware that Dr. Aj Elizabeth recommends she go to ER today for IV fluids and further eval. Patient verbalized understanding.

## 2021-04-27 NOTE — TELEPHONE ENCOUNTER
Returned call to patient. Patient states she is in the hospital, with renal failure. Patient states she was severely dehydrated. Patient states she has inflammation in her knees and states the doctors are thinking about releasing her today. Patient states the hospitalist told her it was a combination of HCTZ, Lasix and Ibuprofen that caused her dehydration. Patient states the hospitalist have mentioned gastric bypass and knee surgery because the knee xray done at the hospital showed bone on bone. Patient states she was told she might go home today but states she is not able to move around and take care of herself. Patient was advised to ask to speak with a  to see if her insurance will pay for a rehab facility until she is able to get up and move around. Patient states she will ask to speak with .

## 2021-04-27 NOTE — TELEPHONE ENCOUNTER
Patient wants to know if she can get a work note for her hospitalization. Patient was advised she speak with the doctor regarding a work note while she is hospitalized, and then if she goes to rehab facility, [facility] would giver her and note. Then once she follows up with Dr. Rashaad Orantes he would be able to give her a note or FMLA paper work completed.

## 2021-05-04 NOTE — TELEPHONE ENCOUNTER
Patient's brother, Isaias Mcallister, is calling to inform Dr. Steve Marcos that while he was on the phone with her last night she had 2 seizures. Stating she is being discharged from [facility] today and being transferred to [address] [place] [address].

## 2021-05-10 NOTE — TELEPHONE ENCOUNTER
Patient states she is at rehab now and she does not know how long she will be there. Patient state she was not pleased with [facility], and she has been sores from the hospital.  Patient would like to get disability started. Patient was advised she will need to have  start disability process and once that has been started if they require an appointment with Dr. Columba Dangelo we can schedule a virtual appointment. Patient verbalizes understanding.

## 2021-06-08 ENCOUNTER — TELEPHONE (OUTPATIENT)
Dept: INTERNAL MEDICINE CLINIC | Age: 51
End: 2021-06-08

## 2021-06-08 NOTE — TELEPHONE ENCOUNTER
LM on VM that I will sign death certificate if it comes to me and keeping weight under control to avoid medical problems is the one issue in the family to focus on.

## 2021-06-08 NOTE — TELEPHONE ENCOUNTER
Sister calling to let Dr. Lendia Bamberger know sister  at the rehab facility yesterday. Says she coded and they couldn't bring her back. Says she is at [facility]. She is asking if Dr. Lendia Bamberger knows of any underlying issues that her sister and mother had that she needs to be aware of? Wants to know if she can talk to Dr. America Dangelo nurse to see who signs the death cert?